# Patient Record
Sex: FEMALE | Race: WHITE | NOT HISPANIC OR LATINO | Employment: FULL TIME | URBAN - METROPOLITAN AREA
[De-identification: names, ages, dates, MRNs, and addresses within clinical notes are randomized per-mention and may not be internally consistent; named-entity substitution may affect disease eponyms.]

---
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
## 2019-09-13 PROBLEM — T63.001A: Status: ACTIVE | Noted: 2019-09-13

## 2019-09-24 ENCOUNTER — OFFICE VISIT (OUTPATIENT)
Dept: FAMILY MEDICINE | Facility: CLINIC | Age: 54
End: 2019-09-24
Payer: COMMERCIAL

## 2019-09-24 ENCOUNTER — LAB VISIT (OUTPATIENT)
Dept: LAB | Facility: HOSPITAL | Age: 54
End: 2019-09-24
Attending: FAMILY MEDICINE
Payer: COMMERCIAL

## 2019-09-24 VITALS
OXYGEN SATURATION: 97 % | SYSTOLIC BLOOD PRESSURE: 122 MMHG | BODY MASS INDEX: 25.67 KG/M2 | DIASTOLIC BLOOD PRESSURE: 82 MMHG | WEIGHT: 173.31 LBS | TEMPERATURE: 98 F | HEIGHT: 69 IN | HEART RATE: 70 BPM

## 2019-09-24 DIAGNOSIS — Z13.6 SCREENING FOR CARDIOVASCULAR CONDITION: ICD-10-CM

## 2019-09-24 DIAGNOSIS — M25.572 CHRONIC PAIN OF LEFT ANKLE: ICD-10-CM
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
## 2019-09-24 DIAGNOSIS — D22.9 NEVUS: ICD-10-CM

## 2019-09-24 DIAGNOSIS — E03.8 OTHER SPECIFIED HYPOTHYROIDISM: ICD-10-CM

## 2019-09-24 DIAGNOSIS — Z12.39 SCREENING FOR BREAST CANCER: ICD-10-CM

## 2019-09-24 DIAGNOSIS — M79.10 MUSCLE ACHE: ICD-10-CM

## 2019-09-24 DIAGNOSIS — R53.83 OTHER FATIGUE: ICD-10-CM

## 2019-09-24 DIAGNOSIS — G89.29 CHRONIC PAIN OF LEFT ANKLE: ICD-10-CM

## 2019-09-24 DIAGNOSIS — J30.1 SEASONAL ALLERGIC RHINITIS DUE TO POLLEN: ICD-10-CM
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
## 2019-09-24 LAB
ALBUMIN SERPL BCP-MCNC: 3.8 G/DL (ref 3.5–5.2)
ALP SERPL-CCNC: 53 U/L (ref 55–135)
ALT SERPL W/O P-5'-P-CCNC: 21 U/L (ref 10–44)
ANION GAP SERPL CALC-SCNC: 4 MMOL/L (ref 8–16)
AST SERPL-CCNC: 18 U/L (ref 10–40)
BASOPHILS # BLD AUTO: 0.08 K/UL (ref 0–0.2)
BASOPHILS NFR BLD: 1.3 % (ref 0–1.9)
BILIRUB SERPL-MCNC: 0.6 MG/DL (ref 0.1–1)
BUN SERPL-MCNC: 15 MG/DL (ref 6–20)
CALCIUM SERPL-MCNC: 9.3 MG/DL (ref 8.7–10.5)
CHLORIDE SERPL-SCNC: 107 MMOL/L (ref 95–110)
CHOLEST SERPL-MCNC: 227 MG/DL (ref 120–199)
CHOLEST/HDLC SERPL: 3.8 {RATIO} (ref 2–5)
CK SERPL-CCNC: 76 U/L (ref 20–180)
CO2 SERPL-SCNC: 28 MMOL/L (ref 23–29)
CREAT SERPL-MCNC: 0.8 MG/DL (ref 0.5–1.4)
DIFFERENTIAL METHOD: ABNORMAL
EOSINOPHIL # BLD AUTO: 0.9 K/UL (ref 0–0.5)
EOSINOPHIL NFR BLD: 14.8 % (ref 0–8)
ERYTHROCYTE [DISTWIDTH] IN BLOOD BY AUTOMATED COUNT: 14.6 % (ref 11.5–14.5)
EST. GFR  (AFRICAN AMERICAN): >60 ML/MIN/1.73 M^2
EST. GFR  (NON AFRICAN AMERICAN): >60 ML/MIN/1.73 M^2
GLUCOSE SERPL-MCNC: 93 MG/DL (ref 70–110)
HCT VFR BLD AUTO: 39.1 % (ref 37–48.5)
HDLC SERPL-MCNC: 60 MG/DL (ref 40–75)
HDLC SERPL: 26.4 % (ref 20–50)
HGB BLD-MCNC: 12.5 G/DL (ref 12–16)
IMM GRANULOCYTES # BLD AUTO: 0.01 K/UL (ref 0–0.04)
IMM GRANULOCYTES NFR BLD AUTO: 0.2 % (ref 0–0.5)
INR PPP: 1 (ref 0.8–1.2)
LDLC SERPL CALC-MCNC: 145 MG/DL (ref 63–159)
LYMPHOCYTES # BLD AUTO: 1.9 K/UL (ref 1–4.8)
LYMPHOCYTES NFR BLD: 29.9 % (ref 18–48)
MCH RBC QN AUTO: 27 PG (ref 27–31)
MCHC RBC AUTO-ENTMCNC: 32 G/DL (ref 32–36)
MCV RBC AUTO: 84 FL (ref 82–98)
MONOCYTES # BLD AUTO: 0.5 K/UL (ref 0.3–1)
MONOCYTES NFR BLD: 8.5 % (ref 4–15)
NEUTROPHILS # BLD AUTO: 2.9 K/UL (ref 1.8–7.7)
NEUTROPHILS NFR BLD: 45.3 % (ref 38–73)
NONHDLC SERPL-MCNC: 167 MG/DL
NRBC BLD-RTO: 0 /100 WBC
PLATELET # BLD AUTO: 282 K/UL (ref 150–350)
PMV BLD AUTO: 10.9 FL (ref 9.2–12.9)
POTASSIUM SERPL-SCNC: 5.3 MMOL/L (ref 3.5–5.1)
PROT SERPL-MCNC: 7.2 G/DL (ref 6–8.4)
PROTHROMBIN TIME: 9.9 SEC (ref 9–12.5)
RBC # BLD AUTO: 4.63 M/UL (ref 4–5.4)
SODIUM SERPL-SCNC: 139 MMOL/L (ref 136–145)
TRIGL SERPL-MCNC: 110 MG/DL (ref 30–150)
TSH SERPL DL<=0.005 MIU/L-ACNC: 1.29 UIU/ML (ref 0.4–4)
WBC # BLD AUTO: 6.36 K/UL (ref 3.9–12.7)

## 2019-09-24 PROCEDURE — 84443 ASSAY THYROID STIM HORMONE: CPT

## 2019-09-24 PROCEDURE — 99204 PR OFFICE/OUTPT VISIT, NEW, LEVL IV, 45-59 MIN: ICD-10-PCS | Mod: S$GLB,,, | Performed by: FAMILY MEDICINE

## 2019-09-24 PROCEDURE — 80061 LIPID PANEL: CPT

## 2019-09-24 PROCEDURE — 36415 COLL VENOUS BLD VENIPUNCTURE: CPT | Mod: PO

## 2019-09-24 PROCEDURE — 82550 ASSAY OF CK (CPK): CPT

## 2019-09-24 PROCEDURE — 85610 PROTHROMBIN TIME: CPT | Mod: PO

## 2019-09-24 PROCEDURE — 99999 PR PBB SHADOW E&M-EST. PATIENT-LVL IV: CPT | Mod: PBBFAC,,, | Performed by: FAMILY MEDICINE

## 2019-09-24 PROCEDURE — 80053 COMPREHEN METABOLIC PANEL: CPT

## 2019-09-24 PROCEDURE — 99204 OFFICE O/P NEW MOD 45 MIN: CPT | Mod: S$GLB,,, | Performed by: FAMILY MEDICINE

## 2019-09-24 PROCEDURE — 3008F PR BODY MASS INDEX (BMI) DOCUMENTED: ICD-10-PCS | Mod: CPTII,S$GLB,, | Performed by: FAMILY MEDICINE

## 2019-09-24 PROCEDURE — 85025 COMPLETE CBC W/AUTO DIFF WBC: CPT

## 2019-09-24 PROCEDURE — 99999 PR PBB SHADOW E&M-EST. PATIENT-LVL IV: ICD-10-PCS | Mod: PBBFAC,,, | Performed by: FAMILY MEDICINE

## 2019-09-24 PROCEDURE — 3008F BODY MASS INDEX DOCD: CPT | Mod: CPTII,S$GLB,, | Performed by: FAMILY MEDICINE

## 2019-09-24 NOTE — PATIENT INSTRUCTIONS
Eating Heart-Healthy Food: Using the DASH Plan    Eating for your heart doesnt have to be hard or boring. You just need to know how to make healthier choices. The DASH eating plan has been developed to help you do just that. DASH stands for Dietary Approaches to Stop Hypertension. It is a plan that has been proven to be healthier for your heart and to lower your risk for high blood pressure. It can also help lower your risk for cancer, heart disease, osteoporosis, and diabetes.  Choosing from each food group  Choose foods from each of the food groups below each day. Try to get the recommended number of servings for each food group. The serving numbers are based on a diet of 2,000 calories a day. Talk to your doctor if youre unsure about your calorie needs. Along with getting the correct servings, the DASH plan also recommends a sodium intake less than 2,300 mg per day.        Grains  Servings: 6 to 8 a day  A serving is:  · 1 slice bread  · 1 ounce dry cereal  · Half a cup cooked rice, pasta or cereal  Best choices: Whole grains and any grains high in fiber. Vegetables  Servings: 4 to 5 a day  A serving is:  · 1 cup raw leafy vegetable  · Half a cup cut-up raw or cooked vegetable  · Half a cup vegetable juice  Best choices: Fresh or frozen vegetables prepared without added salt or fat.   Fruits  Servings: 4 to 5 a day  A serving is:  · 1 medium fruit  · One-quarter cup dried fruit  · Half a cup fresh, frozen, or canned fruit  · Half a cup of 100% fruit juices  Best choices: A variety of fresh fruits of different colors. Whole fruits are a better choice than fruit juices. Low-fat or fat-free dairy  Servings: 2 to 3 a day  A serving is:  · 1 cup milk  · 1 cup yogurt  · One and a half ounces cheese  Best choices: Skim or 1% milk, low-fat or fat-free yogurt or buttermilk, and low-fat cheeses.         Lean meats, poultry, fish  Servings: 6 or fewer a day  A serving is:  · 1 ounce cooked meats, poultry, or fish  · 1  egg  Best choices: Lean poultry and fish. Trim away visible fat. Broil, grill, roast, or boil instead of frying. Remove skin from poultry before eating. Limit how much red meat you eat.  Nuts, seeds, beans  Servings: 4 to 5 a week  A serving is:  · One-third cup nuts (one and a half ounces)  · 2 tablespoons nut butter or seeds  · Half a cup cooked dry beans or legumes  Best choices: Dry roasted nuts with no salt added, lentils, kidney beans, garbanzo beans, and whole pres[place] beans.   Fats and oils  Servings: 2 to 3 a day  A serving is:  · 1 teaspoon vegetable oil  · 1 teaspoon soft margarine  · 1 tablespoon mayonnaise  · 2 tablespoons salad dressing  Best choices: Nut and vegetable oils (nontropical vegetable oils), such as olive and canola oil. Sweets  Servings: 5 a week or fewer  A serving is:  · 1 tablespoon sugar, maple syrup, or honey  · 1 tablespoon jam or jelly  · 1 half-ounce jelly beans (about 15)  · 1 cup lemonade  Best choices: Dried fruit can be a satisfying sweet. Choose low-fat sweets. And watch your serving sizes!      For more on the DASH eating plan, visit:  www.nhlbi.nih.gov/health/health-topics/topics/dash   Date Last Reviewed: 6/1/2016  © 1318-2743 [employer]. [address] [place] [address], [place] [address]. All rights reserved. This information is not intended as a substitute for professional medical care. Always follow your healthcare professional's instructions.

## 2019-09-24 NOTE — PROGRESS NOTES
Subjective:       Patient ID: Kristen Welch is a 54 y.o. female.    Chief Complaint: Establish Care; thyroid fu; and Animal Bite (fu x 13d)    The patient is coming here today to establish a new primary care physician.  She is not today with Pap smear and colonoscopy.  The patient has hypothyroidism and currently taking levothyroxine 88 mcg 1 tablet p.o. q.day, she denies any side effects of the medication.  She also takes Zyrtec daily for chronic allergies, the patient stated that she is allergic to pollen.  The patient also wants a referral to see a dermatologist for mole check, she has been noticed more moles in her face and wants to have them check.    Fatigue   This is a chronic problem. The current episode started more than 1 year ago. The problem occurs intermittently. The problem has been gradually worsening. Associated symptoms include arthralgias, fatigue and myalgias. Pertinent negatives include no abdominal pain, anorexia, change in bowel habit, chest pain, chills, congestion, coughing, diaphoresis, fever, headaches, joint swelling, nausea, neck pain, numbness, rash, sore throat, swollen glands, urinary symptoms, vertigo, visual change, vomiting or weakness. Nothing aggravates the symptoms. She has tried nothing for the symptoms. The treatment provided no relief.   Ankle Injury    Incident onset: It happened in May 2019. The incident occurred at home. The injury mechanism was an inversion injury. The pain is present in the left ankle. The quality of the pain is described as aching. The pain is mild. The pain has been improving since onset. Pertinent negatives include no inability to bear weight, loss of motion, loss of sensation, muscle weakness, numbness or tingling. She reports no foreign bodies present. The symptoms are aggravated by movement, palpation and weight bearing. She has tried elevation, ice and immobilization for the symptoms. The treatment provided mild relief.   Animal Bite    Episode  onset: 09/13/2019. The incident occurred at home. She came to the ER via personal transport. There is an injury to the left fourth toe. The pain is mild. Pertinent negatives include no chest pain, no numbness, no abdominal pain, no nausea, no vomiting, no headaches, no inability to bear weight, no neck pain, no light-headedness, no loss of consciousness, no tingling, no weakness, no cough and no memory loss. There have been no prior injuries to these areas. She has been behaving normally. There were no sick contacts.        Past medical history, past social history, past surgical history, past family history was reviewed and discussed with the patient.    Review of Systems   Constitutional: Positive for fatigue. Negative for activity change, appetite change, chills, diaphoresis and fever.   HENT: Negative for congestion, ear discharge and sore throat.    Eyes: Negative for discharge and itching.   Respiratory: Negative for cough, choking and chest tightness.    Cardiovascular: Negative for chest pain and leg swelling.   Gastrointestinal: Negative for abdominal distention, abdominal pain, anorexia, change in bowel habit, nausea and vomiting.   Endocrine: Negative for cold intolerance and heat intolerance.   Genitourinary: Negative for dysuria and flank pain.   Musculoskeletal: Positive for arthralgias and myalgias. Negative for back pain, joint swelling and neck pain.   Skin: Negative for pallor and rash.   Allergic/Immunologic: Negative for environmental allergies and food allergies.   Neurological: Negative for dizziness, vertigo, tingling, loss of consciousness, facial asymmetry, weakness, light-headedness, numbness and headaches.   Hematological: Negative for adenopathy. Does not bruise/bleed easily.   Psychiatric/Behavioral: Negative for agitation, confusion and memory loss.       Objective:      Physical Exam   Constitutional: She appears well-developed and well-nourished. No distress.   HENT:   Head:  Normocephalic and atraumatic.   Right Ear: External ear normal.   Left Ear: External ear normal.   Mouth/Throat: Oropharynx is clear and moist. No oropharyngeal exudate.   Eyes: Pupils are equal, round, and reactive to light. Conjunctivae are normal. Right eye exhibits no discharge. Left eye exhibits no discharge. No scleral icterus.   Neck: Neck supple. No thyromegaly present.   Cardiovascular: Normal rate, regular rhythm and normal heart sounds.   No murmur heard.  Pulmonary/Chest: Effort normal and breath sounds normal. No respiratory distress. She has no wheezes.   Abdominal: She exhibits no distension. There is no tenderness.   Musculoskeletal: She exhibits tenderness (Tenderness to palpation on the inner malleolus, no swelling or redness was visualized). She exhibits no deformity.   Neurological: No cranial nerve deficit. Coordination normal.   Skin: No rash noted. She is not diaphoretic. No erythema. No pallor.   Psychiatric: She has a normal mood and affect. Her behavior is normal. Judgment and thought content normal.   Nursing note and vitals reviewed.      Assessment:       1. Snake bite, initial encounter    2. Screening for cardiovascular condition    3. Other specified hypothyroidism    4. Seasonal allergic rhinitis due to pollen    5. Other fatigue    6. Muscle ache    7. Screening for breast cancer    8. Chronic pain of left ankle        Plan:       Snake bite, initial encounter:  New problem, workup needed  -     Protime-INR; Future; Expected date: 09/24/2019    Screening for cardiovascular condition  -     Lipid panel; Future; Expected date: 09/24/2019    Other specified hypothyroidism:  New problem, workup needed  -     TSH; Future; Expected date: 09/24/2019    Seasonal allergic rhinitis due to pollen:  New problem, no further workup needed    Other fatigue:  New problem workup needed  -     CBC auto differential; Future; Expected date: 09/24/2019  -     Comprehensive metabolic panel; Future;  Expected date: 09/24/2019    Muscle ache:  New problem, workup needed  -     CK; Future; Expected date: 09/24/2019    Screening for breast cancer  -     Mammo Digital Screening Bilateral With CAD; Future; Expected date: 09/24/2019    Chronic pain of left ankle:  New problem, workup needed    The patient was advised to eat healthy, avoid fried foods, red meat and processed starches.  Dash diet was recommended.  Will repeat blood work.  Will defer immunizations for couple weeks.  Will get the records for Pap smear and colonoscopy.  The patient was advised to be mobilized ankle, she was advised to get a referral to see the podiatrist, but she declined, she will contact us if she change her mind.  Patient agreed with assessment and plan. Patient verbalized understanding.

## 2019-09-25 DIAGNOSIS — E87.5 HYPERKALEMIA: Primary | ICD-10-CM

## 2019-09-26 ENCOUNTER — INITIAL CONSULT (OUTPATIENT)
Dept: DERMATOLOGY | Facility: CLINIC | Age: 54
End: 2019-09-26
Payer: COMMERCIAL

## 2019-09-26 VITALS — WEIGHT: 173 LBS | HEIGHT: 69 IN | BODY MASS INDEX: 25.62 KG/M2

## 2019-09-26 DIAGNOSIS — L81.4 LENTIGINES: ICD-10-CM

## 2019-09-26 DIAGNOSIS — D22.9 MULTIPLE BENIGN NEVI: ICD-10-CM

## 2019-09-26 DIAGNOSIS — D48.5 NEOPLASM OF UNCERTAIN BEHAVIOR OF SKIN: ICD-10-CM

## 2019-09-26 DIAGNOSIS — L73.8 SEBACEOUS HYPERPLASIA: ICD-10-CM

## 2019-09-26 DIAGNOSIS — L82.1 SEBORRHEIC KERATOSES: Primary | ICD-10-CM

## 2019-09-26 DIAGNOSIS — D18.00 ANGIOMA: ICD-10-CM

## 2019-09-26 DIAGNOSIS — D23.9 DERMATOFIBROMA: ICD-10-CM

## 2019-09-26 PROCEDURE — 99203 OFFICE O/P NEW LOW 30 MIN: CPT | Mod: S$GLB,,, | Performed by: DERMATOLOGY

## 2019-09-26 PROCEDURE — 99999 PR PBB SHADOW E&M-EST. PATIENT-LVL II: ICD-10-PCS | Mod: PBBFAC,,, | Performed by: DERMATOLOGY

## 2019-09-26 PROCEDURE — 3008F PR BODY MASS INDEX (BMI) DOCUMENTED: ICD-10-PCS | Mod: CPTII,S$GLB,, | Performed by: DERMATOLOGY

## 2019-09-26 PROCEDURE — 3008F BODY MASS INDEX DOCD: CPT | Mod: CPTII,S$GLB,, | Performed by: DERMATOLOGY

## 2019-09-26 PROCEDURE — 99203 PR OFFICE/OUTPT VISIT, NEW, LEVL III, 30-44 MIN: ICD-10-PCS | Mod: S$GLB,,, | Performed by: DERMATOLOGY

## 2019-09-26 PROCEDURE — 99999 PR PBB SHADOW E&M-EST. PATIENT-LVL II: CPT | Mod: PBBFAC,,, | Performed by: DERMATOLOGY

## 2019-09-26 NOTE — LETTER
September 26, 2019      Katherine Pride MD  [address] [facility] [address] [place] [address]           [facility]  [address] [place] [address]  Phone: [phone]  Fax: [phone]          Patient: Kristen Welch   MR Number: [id number]   YOB: 1965   Date of Visit: 9/26/2019       Dear Dr. Katherine Pride:    Thank you for referring Kristen Welch to me for evaluation. Attached you will find relevant portions of my assessment and plan of care.    If you have questions, please do not hesitate to call me. I look forward to following Kristen Welch along with you.    Sincerely,    Junie Simpson MD    Enclosure  CC:  No Recipients    If you would like to receive this communication electronically, please contact [email or web address] or [phone] to request more information on [employer] access.    For providers and/or their staff who would like to refer a patient to [facility], please contact us through our one-stop-shop provider referral line, [facility], at [phone].    If you feel you have received this communication in error or would no longer like to receive these types of communications, please e-mail [email or web address]

## 2019-09-26 NOTE — PROGRESS NOTES
Subjective:       Patient ID:  Kristen Welch is a 54 y.o. female who presents for   Chief Complaint   Patient presents with    Skin Check     TBSE    Spot     nose     HPI  55 yo F presents for TBSE.  There is a spot near her nose x several years, stable in size.  It had a scab that fell off.  No prior treatments.     No phx of skin cancer.  Unknown family history (patient is adopted)    Past Medical History:   Diagnosis Date    Thyroid disease      Review of Systems   Skin: Positive for daily sunscreen use, activity-related sunscreen use and wears hat.   Hematologic/Lymphatic: Does not bruise/bleed easily.        Objective:    Physical Exam   Constitutional: She appears well-developed and well-nourished. No distress.   HENT:   Mouth/Throat: Lips normal.    Eyes: Lids are normal.    Neurological: She is alert and oriented to person, place, and time. She is not disoriented.   Psychiatric: She has a normal mood and affect.   Skin:   Areas Examined (abnormalities noted in diagram):   Head / Face Inspection Performed  Neck Inspection Performed  Chest / Axilla Inspection Performed  Abdomen Inspection Performed  Genitals / Buttocks / Groin Inspection Performed  Back Inspection Performed  RUE Inspected  LUE Inspection Performed  RLE Inspected  LLE Inspection Performed                       Diagram Legend     Erythematous scaling macule/papule c/w actinic keratosis       Vascular papule c/w angioma      Pigmented verrucoid papule/plaque c/w seborrheic keratosis      Yellow umbilicated papule c/w sebaceous hyperplasia      Irregularly shaped tan macule c/w lentigo     1-2 mm smooth white papules consistent with Milia      Movable subcutaneous cyst with punctum c/w epidermal inclusion cyst      Subcutaneous movable cyst c/w pilar cyst      Firm pink to brown papule c/w dermatofibroma      Pedunculated fleshy papule(s) c/w skin tag(s)      Evenly pigmented macule c/w junctional nevus     Mildly variegated pigmented,  slightly irregular-bordered macule c/w mildly atypical nevus      Flesh colored to evenly pigmented papule c/w intradermal nevus       Pink pearly papule/plaque c/w basal cell carcinoma      Erythematous hyperkeratotic cursted plaque c/w SCC      Surgical scar with no sign of skin cancer recurrence      Open and closed comedones      Inflammatory papules and pustules      Verrucoid papule consistent consistent with wart     Erythematous eczematous patches and plaques     Dystrophic onycholytic nail with subungual debris c/w onychomycosis     Umbilicated papule    Erythematous-base heme-crusted tan verrucoid plaque consistent with inflamed seborrheic keratosis     Erythematous Silvery Scaling Plaque c/w Psoriasis     See annotation      Assessment / Plan:      Seborrheic keratoses  These are benign inherited growths without a malignant potential. Reassurance given to patient. No treatment is necessary.   Discussed LN    Multiple benign nevi  Reassurance that her nevi appear benign with regular and consistent pigment pattern on dermoscopy    Lentigines  These are benign hyperpigmented sun induced lesions. Daily sun protection will reduce the number of new lesions    Sebaceous hyperplasia  This is a common condition representing benign enlargement of the sebaceous lobule. It typically occurs in adulthood. Reassurance given to patient.     Dermatofibroma  Reassurance that this is a benign collection of scar tissue and it is commonly located on the legs    Angioma  This is a benign vascular lesion. Reassurance given. No treatment required.     Neoplasm of uncertain behavior of skin  Inflamed SH, less likely NMSC since stable in size, no bleeding  Will monitor and check again in 2-3 months           Follow up in about 3 months (around 12/26/2019).

## 2019-09-27 DIAGNOSIS — Z12.11 COLON CANCER SCREENING: ICD-10-CM

## 2019-10-03 ENCOUNTER — PATIENT MESSAGE (OUTPATIENT)
Dept: FAMILY MEDICINE | Facility: CLINIC | Age: 54
End: 2019-10-03

## 2019-10-03 NOTE — TELEPHONE ENCOUNTER
Can you please release the records from [facility] for this patient last colonoscopy information.  Thank you

## 2019-10-06 ENCOUNTER — PATIENT MESSAGE (OUTPATIENT)
Dept: FAMILY MEDICINE | Facility: CLINIC | Age: 54
End: 2019-10-06

## 2019-10-07 RX ORDER — LEVOTHYROXINE SODIUM 88 UG/1
88 TABLET ORAL DAILY
Qty: 90 TABLET | Refills: 3 | Status: SHIPPED | OUTPATIENT
Start: 2019-10-07 | End: 2020-10-06 | Stop reason: SDUPTHER

## 2019-10-31 ENCOUNTER — HOSPITAL ENCOUNTER (OUTPATIENT)
Dept: RADIOLOGY | Facility: HOSPITAL | Age: 54
Discharge: HOME | End: 2019-10-31
Attending: FAMILY MEDICINE
Payer: COMMERCIAL

## 2019-10-31 DIAGNOSIS — Z12.39 SCREENING FOR BREAST CANCER: ICD-10-CM

## 2019-10-31 DIAGNOSIS — Z12.31 ENCOUNTER FOR SCREENING MAMMOGRAM FOR HIGH-RISK PATIENT: ICD-10-CM

## 2019-10-31 PROCEDURE — 77067 MAMMO DIGITAL SCREENING BILAT WITH TOMOSYNTHESIS_CAD: ICD-10-PCS | Mod: 26,,, | Performed by: RADIOLOGY

## 2019-10-31 PROCEDURE — 77063 MAMMO DIGITAL SCREENING BILAT WITH TOMOSYNTHESIS_CAD: ICD-10-PCS | Mod: 26,,, | Performed by: RADIOLOGY

## 2019-10-31 PROCEDURE — 77067 SCR MAMMO BI INCL CAD: CPT | Mod: 26,,, | Performed by: RADIOLOGY

## 2019-10-31 PROCEDURE — 77063 BREAST TOMOSYNTHESIS BI: CPT | Mod: 26,,, | Performed by: RADIOLOGY

## 2019-10-31 PROCEDURE — 77067 SCR MAMMO BI INCL CAD: CPT | Mod: TC,PO

## 2019-11-21 ENCOUNTER — OFFICE VISIT (OUTPATIENT)
Dept: DERMATOLOGY | Facility: CLINIC | Age: 54
End: 2019-11-21
Payer: COMMERCIAL

## 2019-11-21 VITALS — BODY MASS INDEX: 25.62 KG/M2 | HEIGHT: 69 IN | WEIGHT: 173 LBS

## 2019-11-21 DIAGNOSIS — L73.8 SEBACEOUS HYPERPLASIA: Primary | ICD-10-CM

## 2019-11-21 PROCEDURE — 99213 OFFICE O/P EST LOW 20 MIN: CPT | Mod: S$GLB,,, | Performed by: DERMATOLOGY

## 2019-11-21 PROCEDURE — 99999 PR PBB SHADOW E&M-EST. PATIENT-LVL III: CPT | Mod: PBBFAC,,, | Performed by: DERMATOLOGY

## 2019-11-21 PROCEDURE — 3008F BODY MASS INDEX DOCD: CPT | Mod: CPTII,S$GLB,, | Performed by: DERMATOLOGY

## 2019-11-21 PROCEDURE — 99213 PR OFFICE/OUTPT VISIT, EST, LEVL III, 20-29 MIN: ICD-10-PCS | Mod: S$GLB,,, | Performed by: DERMATOLOGY

## 2019-11-21 PROCEDURE — 3008F PR BODY MASS INDEX (BMI) DOCUMENTED: ICD-10-PCS | Mod: CPTII,S$GLB,, | Performed by: DERMATOLOGY

## 2019-11-21 PROCEDURE — 99999 PR PBB SHADOW E&M-EST. PATIENT-LVL III: ICD-10-PCS | Mod: PBBFAC,,, | Performed by: DERMATOLOGY

## 2019-11-21 NOTE — PROGRESS NOTES
Subjective:       Patient ID:  Kristen Welch is a 54 y.o. female who presents for   Chief Complaint   Patient presents with    Follow-up     Last OV 09/26/2019        55 yo F presents for follow up for spot spot near her nose x several years, stable in size, no bleeding.  Patient feels its the same in size, shape, and color. No treatments    No phx of skin cancer.  Unknown family history (patient is adopted)      Past Medical History:   Diagnosis Date    Thyroid disease        Review of Systems   Skin: Positive for daily sunscreen use, activity-related sunscreen use and wears hat.   Hematologic/Lymphatic: Does not bruise/bleed easily.        Objective:    Physical Exam   Constitutional: She appears well-developed and well-nourished.   HENT:   Mouth/Throat: Lips normal.    Eyes: Lids are normal.    Neurological: She is alert and oriented to person, place, and time.   Psychiatric: She has a normal mood and affect.   Skin:   Areas Examined (abnormalities noted in diagram):   Head / Face Inspection Performed  Neck Inspection Performed                      Diagram Legend     Erythematous scaling macule/papule c/w actinic keratosis       Vascular papule c/w angioma      Pigmented verrucoid papule/plaque c/w seborrheic keratosis      Yellow umbilicated papule c/w sebaceous hyperplasia      Irregularly shaped tan macule c/w lentigo     1-2 mm smooth white papules consistent with Milia      Movable subcutaneous cyst with punctum c/w epidermal inclusion cyst      Subcutaneous movable cyst c/w pilar cyst      Firm pink to brown papule c/w dermatofibroma      Pedunculated fleshy papule(s) c/w skin tag(s)      Evenly pigmented macule c/w junctional nevus     Mildly variegated pigmented, slightly irregular-bordered macule c/w mildly atypical nevus      Flesh colored to evenly pigmented papule c/w intradermal nevus       Pink pearly papule/plaque c/w basal cell carcinoma      Erythematous hyperkeratotic cursted plaque c/w  SCC      Surgical scar with no sign of skin cancer recurrence      Open and closed comedones      Inflammatory papules and pustules      Verrucoid papule consistent consistent with wart     Erythematous eczematous patches and plaques     Dystrophic onycholytic nail with subungual debris c/w onychomycosis     Umbilicated papule    Erythematous-base heme-crusted tan verrucoid plaque consistent with inflamed seborrheic keratosis     Erythematous Silvery Scaling Plaque c/w Psoriasis     See annotation      Assessment / Plan:        Sebaceous hyperplasia    Reassurance given to patient that this is likely a benign enlargement of the sebaceous lobule. It is a bit more tan/brown that the usual SH but it has remained stable in size with no bleeding.  Pt will monitor for bleeding and increase in size and will let me know if it changes.             Follow up for Sept 2020.

## 2020-02-12 ENCOUNTER — TELEPHONE (OUTPATIENT)
Dept: PHARMACY | Facility: CLINIC | Age: 55
End: 2020-02-12

## 2020-02-12 NOTE — TELEPHONE ENCOUNTER
Notified PT PA for Myrbetriq faxed to outside MD, Dr. Nava.    Pt verbalized understanding and is aware that his office may reach out to her.    Thank You!  Tiny Moore  Patient Care Advocate  [facility]  Phone: [phone] ext [phone]  Fax: [phone]

## 2020-03-05 ENCOUNTER — OFFICE VISIT (OUTPATIENT)
Dept: OPTOMETRY | Facility: CLINIC | Age: 55
End: 2020-03-05
Payer: COMMERCIAL

## 2020-03-05 DIAGNOSIS — H43.393 VITREOUS FLOATERS, BILATERAL: ICD-10-CM

## 2020-03-05 DIAGNOSIS — Z01.00 EXAMINATION OF EYES AND VISION: Primary | ICD-10-CM

## 2020-03-05 DIAGNOSIS — H52.4 HYPEROPIA WITH ASTIGMATISM AND PRESBYOPIA, BILATERAL: ICD-10-CM

## 2020-03-05 DIAGNOSIS — H52.203 HYPEROPIA WITH ASTIGMATISM AND PRESBYOPIA, BILATERAL: ICD-10-CM

## 2020-03-05 DIAGNOSIS — H52.03 HYPEROPIA WITH ASTIGMATISM AND PRESBYOPIA, BILATERAL: ICD-10-CM

## 2020-03-05 PROCEDURE — 99999 PR PBB SHADOW E&M-EST. PATIENT-LVL III: ICD-10-PCS | Mod: PBBFAC,,, | Performed by: OPTOMETRIST

## 2020-03-05 PROCEDURE — 92004 COMPRE OPH EXAM NEW PT 1/>: CPT | Mod: S$GLB,,, | Performed by: OPTOMETRIST

## 2020-03-05 PROCEDURE — 99999 PR PBB SHADOW E&M-EST. PATIENT-LVL III: CPT | Mod: PBBFAC,,, | Performed by: OPTOMETRIST

## 2020-03-05 PROCEDURE — 92015 PR REFRACTION: ICD-10-PCS | Mod: S$GLB,,, | Performed by: OPTOMETRIST

## 2020-03-05 PROCEDURE — 92004 PR EYE EXAM, NEW PATIENT,COMPREHESV: ICD-10-PCS | Mod: S$GLB,,, | Performed by: OPTOMETRIST

## 2020-03-05 PROCEDURE — 92015 DETERMINE REFRACTIVE STATE: CPT | Mod: S$GLB,,, | Performed by: OPTOMETRIST

## 2020-03-05 NOTE — PROGRESS NOTES
HPI     Routine eye exam--[facility] [place] [facility]    Pt complains of blurred vision at distance and near. Needing updated   glasses rx. Denies any eye pain. No flashes or floaters. Occasional   blurred peripheral vision in the AM.    Last edited by Isabelle Bill on 3/5/2020  8:22 AM. (History)        ROS     Positive for: Eyes    Negative for: Constitutional, Gastrointestinal, Neurological, Skin,   Genitourinary, Musculoskeletal, HENT, Endocrine, Cardiovascular,   Respiratory, Psychiatric, Allergic/Imm, Heme/Lymph    Last edited by ASTRID Cardoso, OD on 3/5/2020  8:32 AM. (History)        Assessment /Plan     For exam results, see Encounter Report.    Examination of eyes and vision    Hyperopia with astigmatism and presbyopia, bilateral    Vitreous floaters, bilateral      1. Ocular health exam OU  Normal  2. Updated specs rx, gave copy, fill prn--slight increased add  3. RD precautions given    Discussed and educated patient on current findings /plan.  RTC 1 year, prn if any changes / issues

## 2020-03-05 NOTE — PATIENT INSTRUCTIONS
"DRY EYES:  Use Over The Counter artificial tears as needed for dry eye symptoms.  Some common brands include:  Systane, Optive, and Refresh.  These drops can be used as frequently as desired, but may be most helpful use during long periods of concentrated work.  For example, reading / working at the computer.  Ophthalmic gel or ointments are available: Refresh PM, Genteal, and Lacrilube.  Avoid drops that "get redness out" (Visine, Murine, Clear Eyes), as these may contain medication that could further irritate the eyes.    ALLERGY EYES / ITCHING SYMPTOMS:  Over the counter medications include--Zaditor and Alaway  Use as directed 1-2 drops daily for symptoms of itching / watering eyes.  These drops will not help for dry eye or exposure symptoms.    REDNESS RELIEF:  Lumify---is a good redness reliever that will not cause chronic irritation.        FLASHES / FLOATERS / POSTERIOR VITREOUS DETACHMENT    Call the clinic if you have any further changes in symptoms.  Including:  Increased numbers of floaters or flashing lights, dimness or darkness that moves through or stays constant in your vision, or any pain in the eye (s).    You may sometimes see small specks or clouds moving in your field of vision.  They are called FLOATERS.  You can often see them when looking at a plain background, like a blank wall or blue cornel.  Floaters are actually tiny clumps of gel or cells inside the VITREOUS, the clear jelly-like fluid that fills the inside of your eye.    While these objects look like they are in front of your eye, they are actually floating inside.  What you see are the shadows they cast on the RETINA, the nerve layer at the back of the eye that senses light and allows you to see.      POSTERIOR VITREOUS DETACHMENT    The appearance of new floaters may be alarming.  If you suddenly develop new floaters, you should contact your eye care professional  right away.    The retina can tear if the shrinking vitreous pulls away " from the wall of the eye.  This sometimes causes a small amount of bleeding in the eye that may appear as new floaters.    A torn retina is always a serious problem, since it can lead to a retinal detachment.  You should see your eye care professional as soon as possible if:     even one new floater appears suddenly;   you see sudden flashes of light;   you notice other symptoms, like the loss of side vision, or a curtain closes down in your vision        POSTERIOR VITREOUS DETACHMENT is more common for people who:     are nearsighted;   have had cataract surgery;   have had YAG laser surgery of the eye;   have had inflammation inside the eye;   are over age 60.      While some floaters may remain visible, many of them will fade over time and become less noticeable.  Even if you've had some floaters for years, you should have your eyes checked as soon as possible if you notice new ones.    FLASHING LIGHTS    When the vitreous gel rubs or pulls on the retina, you may see what look like flashing lights or lightning streaks.  These flashes can appear off and on for several weeks or months.      Some people experience flashes of light that appear as jagged lines or heat waves in both eyes, lasting 10-20 minutes.  These flashes are caused by a spasm of blood vessels in the brain, which is called a migraine.    If a headache follows these flashes, it's called a migraine headache.  If   no headache occurs, these flashes are called Ophthalmic or Ocular Migraine.

## 2020-03-17 LAB
HUMAN PAPILLOMAVIRUS (HPV): ABNORMAL
HUMAN PAPILLOMAVIRUS (HPV): NORMAL
PAP SMEAR: ABNORMAL

## 2020-03-24 ENCOUNTER — PATIENT MESSAGE (OUTPATIENT)
Dept: FAMILY MEDICINE | Facility: CLINIC | Age: 55
End: 2020-03-24

## 2020-10-05 ENCOUNTER — PATIENT MESSAGE (OUTPATIENT)
Dept: ADMINISTRATIVE | Facility: HOSPITAL | Age: 55
End: 2020-10-05

## 2020-10-06 RX ORDER — LEVOTHYROXINE SODIUM 88 UG/1
88 TABLET ORAL DAILY
Qty: 90 TABLET | Refills: 3 | Status: SHIPPED | OUTPATIENT
Start: 2020-10-06 | End: 2021-07-06 | Stop reason: SDUPTHER

## 2020-10-06 NOTE — TELEPHONE ENCOUNTER
Care Due:                  Date            Visit Type   Department     Provider  --------------------------------------------------------------------------------                                NEW PATIENT   [facility]  Last Visit: 09-      [facility]     MEDICINE       LORNA ODEN  Next Visit: None Scheduled  None         None Found                                                            Last  Test          Frequency    Reason                     Performed    Due Date  --------------------------------------------------------------------------------    Office Visit  12 months..  levothyroxine............  09- 09-    TSH.........  12 months..  levothyroxine............  09- 09-    Powered by [employer]. Reference number: [id number]. 10/06/2020 7:50:08 AM   RAHEL

## 2021-01-04 ENCOUNTER — PATIENT MESSAGE (OUTPATIENT)
Dept: ADMINISTRATIVE | Facility: HOSPITAL | Age: 56
End: 2021-01-04

## 2021-01-06 DIAGNOSIS — Z12.31 OTHER SCREENING MAMMOGRAM: ICD-10-CM

## 2021-01-07 ENCOUNTER — PATIENT OUTREACH (OUTPATIENT)
Dept: ADMINISTRATIVE | Facility: HOSPITAL | Age: 56
End: 2021-01-07

## 2021-03-02 ENCOUNTER — OFFICE VISIT (OUTPATIENT)
Dept: FAMILY MEDICINE | Facility: CLINIC | Age: 56
End: 2021-03-02
Payer: COMMERCIAL

## 2021-03-02 VITALS
BODY MASS INDEX: 25.93 KG/M2 | DIASTOLIC BLOOD PRESSURE: 70 MMHG | OXYGEN SATURATION: 98 % | HEART RATE: 78 BPM | HEIGHT: 69 IN | TEMPERATURE: 99 F | WEIGHT: 175.06 LBS | SYSTOLIC BLOOD PRESSURE: 112 MMHG

## 2021-03-02 DIAGNOSIS — Z00.01 ANNUAL VISIT FOR GENERAL ADULT MEDICAL EXAMINATION WITH ABNORMAL FINDINGS: Primary | ICD-10-CM

## 2021-03-02 PROCEDURE — 3008F PR BODY MASS INDEX (BMI) DOCUMENTED: ICD-10-PCS | Mod: CPTII,S$GLB,, | Performed by: FAMILY MEDICINE

## 2021-03-02 PROCEDURE — 99999 PR PBB SHADOW E&M-EST. PATIENT-LVL IV: CPT | Mod: PBBFAC,,, | Performed by: FAMILY MEDICINE

## 2021-03-02 PROCEDURE — 1126F AMNT PAIN NOTED NONE PRSNT: CPT | Mod: S$GLB,,, | Performed by: FAMILY MEDICINE

## 2021-03-02 PROCEDURE — 99396 PREV VISIT EST AGE 40-64: CPT | Mod: S$GLB,,, | Performed by: FAMILY MEDICINE

## 2021-03-02 PROCEDURE — 1126F PR PAIN SEVERITY QUANTIFIED, NO PAIN PRESENT: ICD-10-PCS | Mod: S$GLB,,, | Performed by: FAMILY MEDICINE

## 2021-03-02 PROCEDURE — 3008F BODY MASS INDEX DOCD: CPT | Mod: CPTII,S$GLB,, | Performed by: FAMILY MEDICINE

## 2021-03-02 PROCEDURE — 99396 PR PREVENTIVE VISIT,EST,40-64: ICD-10-PCS | Mod: S$GLB,,, | Performed by: FAMILY MEDICINE

## 2021-03-02 PROCEDURE — 99999 PR PBB SHADOW E&M-EST. PATIENT-LVL IV: ICD-10-PCS | Mod: PBBFAC,,, | Performed by: FAMILY MEDICINE

## 2021-03-04 ENCOUNTER — HOSPITAL ENCOUNTER (OUTPATIENT)
Dept: RADIOLOGY | Facility: HOSPITAL | Age: 56
Discharge: HOME | End: 2021-03-04
Attending: FAMILY MEDICINE
Payer: COMMERCIAL

## 2021-03-04 DIAGNOSIS — Z12.31 OTHER SCREENING MAMMOGRAM: ICD-10-CM

## 2021-03-04 PROCEDURE — 77067 MAMMO DIGITAL SCREENING BILAT WITH TOMO: ICD-10-PCS | Mod: 26,,, | Performed by: RADIOLOGY

## 2021-03-04 PROCEDURE — 77067 SCR MAMMO BI INCL CAD: CPT | Mod: 26,,, | Performed by: RADIOLOGY

## 2021-03-04 PROCEDURE — 77063 MAMMO DIGITAL SCREENING BILAT WITH TOMO: ICD-10-PCS | Mod: 26,,, | Performed by: RADIOLOGY

## 2021-03-04 PROCEDURE — 77063 BREAST TOMOSYNTHESIS BI: CPT | Mod: 26,,, | Performed by: RADIOLOGY

## 2021-03-04 PROCEDURE — 77067 SCR MAMMO BI INCL CAD: CPT | Mod: TC,PO

## 2021-05-10 ENCOUNTER — PATIENT MESSAGE (OUTPATIENT)
Dept: RESEARCH | Facility: HOSPITAL | Age: 56
End: 2021-05-10

## 2021-07-06 RX ORDER — LEVOTHYROXINE SODIUM 88 UG/1
88 TABLET ORAL DAILY
Qty: 90 TABLET | Refills: 3 | Status: SHIPPED | OUTPATIENT
Start: 2021-07-06 | End: 2022-10-03 | Stop reason: SDUPTHER

## 2021-11-10 ENCOUNTER — OFFICE VISIT (OUTPATIENT)
Dept: OPTOMETRY | Facility: CLINIC | Age: 56
End: 2021-11-10
Payer: COMMERCIAL

## 2021-11-10 DIAGNOSIS — H52.03 HYPEROPIA WITH ASTIGMATISM AND PRESBYOPIA, BILATERAL: ICD-10-CM

## 2021-11-10 DIAGNOSIS — H43.393 VITREOUS FLOATERS, BILATERAL: ICD-10-CM

## 2021-11-10 DIAGNOSIS — H52.203 HYPEROPIA WITH ASTIGMATISM AND PRESBYOPIA, BILATERAL: ICD-10-CM

## 2021-11-10 DIAGNOSIS — Z01.00 EXAMINATION OF EYES AND VISION: Primary | ICD-10-CM

## 2021-11-10 DIAGNOSIS — H52.4 HYPEROPIA WITH ASTIGMATISM AND PRESBYOPIA, BILATERAL: ICD-10-CM

## 2021-11-10 PROCEDURE — 99999 PR PBB SHADOW E&M-EST. PATIENT-LVL III: ICD-10-PCS | Mod: PBBFAC,,, | Performed by: OPTOMETRIST

## 2021-11-10 PROCEDURE — 92014 COMPRE OPH EXAM EST PT 1/>: CPT | Mod: S$GLB,,, | Performed by: OPTOMETRIST

## 2021-11-10 PROCEDURE — 92014 PR EYE EXAM, EST PATIENT,COMPREHESV: ICD-10-PCS | Mod: S$GLB,,, | Performed by: OPTOMETRIST

## 2021-11-10 PROCEDURE — 99999 PR PBB SHADOW E&M-EST. PATIENT-LVL III: CPT | Mod: PBBFAC,,, | Performed by: OPTOMETRIST

## 2021-11-10 PROCEDURE — 92015 PR REFRACTION: ICD-10-PCS | Mod: S$GLB,,, | Performed by: OPTOMETRIST

## 2021-11-10 PROCEDURE — 92015 DETERMINE REFRACTIVE STATE: CPT | Mod: S$GLB,,, | Performed by: OPTOMETRIST

## 2022-01-04 ENCOUNTER — PATIENT MESSAGE (OUTPATIENT)
Dept: ADMINISTRATIVE | Facility: CLINIC | Age: 57
End: 2022-01-04
Payer: COMMERCIAL

## 2022-01-04 ENCOUNTER — PATIENT MESSAGE (OUTPATIENT)
Dept: FAMILY MEDICINE | Facility: CLINIC | Age: 57
End: 2022-01-04
Payer: COMMERCIAL

## 2022-03-09 DIAGNOSIS — Z12.31 OTHER SCREENING MAMMOGRAM: ICD-10-CM

## 2022-05-31 ENCOUNTER — PATIENT MESSAGE (OUTPATIENT)
Dept: ADMINISTRATIVE | Facility: HOSPITAL | Age: 57
End: 2022-05-31
Payer: COMMERCIAL

## 2022-08-24 ENCOUNTER — PATIENT MESSAGE (OUTPATIENT)
Dept: ADMINISTRATIVE | Facility: HOSPITAL | Age: 57
End: 2022-08-24
Payer: COMMERCIAL

## 2022-09-29 RX ORDER — LEVOTHYROXINE SODIUM 88 UG/1
88 TABLET ORAL DAILY
Qty: 90 TABLET | Refills: 3 | OUTPATIENT
Start: 2022-09-29

## 2022-09-29 NOTE — TELEPHONE ENCOUNTER
No new care gaps identified.  [facility] Embedded Care Gaps. Reference number: [id number]. 9/28/2022   8:43:36 PM CDT

## 2022-09-30 DIAGNOSIS — Z00.01 ANNUAL VISIT FOR GENERAL ADULT MEDICAL EXAMINATION WITH ABNORMAL FINDINGS: Primary | ICD-10-CM

## 2022-09-30 RX ORDER — LEVOTHYROXINE SODIUM 88 UG/1
88 TABLET ORAL DAILY
Qty: 90 TABLET | Refills: 3 | OUTPATIENT
Start: 2022-09-30

## 2022-09-30 NOTE — TELEPHONE ENCOUNTER
----- Message from Casper Mcneil sent at 9/30/2022  2:30 PM CDT -----  Type:  Sooner Appointment Request    Caller is requesting a sooner appointment.  Caller declined first available appointment listed below.  Caller will not accept being placed on the waitlist and is requesting a message be sent to doctor.    Name of Caller:  patient  When is the first available appointment?  1/3/23  Symptoms:    Best Call Back Number:  [phone]   Additional Information:  annual visit and need medication refilled.

## 2022-09-30 NOTE — TELEPHONE ENCOUNTER
No new care gaps identified.  [facility] Embedded Care Gaps. Reference number: [id number]. 9/30/2022   2:35:59 PM CDT

## 2022-09-30 NOTE — TELEPHONE ENCOUNTER
----- Message from Casper Mcneil sent at 9/30/2022  2:32 PM CDT -----  Type:  RX Refill Request    Who Called:  patient  Refill or New Rx:  refill  RX Name and Strength:  levothyroxine (SYNTHROID) 88 MCG tablet  How is the patient currently taking it? (ex. 1XDay):    Is this a 30 day or 90 day RX:    Preferred Pharmacy with phone number:    [facility]  [address] [facility] [address] [place] [address]  Phone: [phone] Fax: [phone]     Local or Mail Order:  local  Ordering Provider:  Dr franca Mcduffie Call Back Number:  [phone]   Additional Information:

## 2022-10-03 RX ORDER — LEVOTHYROXINE SODIUM 88 UG/1
88 TABLET ORAL DAILY
Qty: 30 TABLET | Refills: 0 | Status: SHIPPED | OUTPATIENT
Start: 2022-10-03 | End: 2022-10-10

## 2022-10-04 ENCOUNTER — PATIENT MESSAGE (OUTPATIENT)
Dept: FAMILY MEDICINE | Facility: CLINIC | Age: 57
End: 2022-10-04
Payer: COMMERCIAL

## 2022-10-04 NOTE — TELEPHONE ENCOUNTER
Annual appt set up with DR. Pride.  Pt is aware of labs needed, scheduling ticket sent.  Mammogram scheduling ticket sent.

## 2022-10-06 ENCOUNTER — LAB VISIT (OUTPATIENT)
Dept: LAB | Facility: HOSPITAL | Age: 57
End: 2022-10-06
Attending: FAMILY MEDICINE
Payer: COMMERCIAL

## 2022-10-06 DIAGNOSIS — Z00.01 ANNUAL VISIT FOR GENERAL ADULT MEDICAL EXAMINATION WITH ABNORMAL FINDINGS: ICD-10-CM

## 2022-10-06 LAB
ALBUMIN SERPL BCP-MCNC: 3.9 G/DL (ref 3.5–5.2)
ALP SERPL-CCNC: 37 U/L (ref 55–135)
ALT SERPL W/O P-5'-P-CCNC: 31 U/L (ref 10–44)
ANION GAP SERPL CALC-SCNC: 9 MMOL/L (ref 8–16)
AST SERPL-CCNC: 22 U/L (ref 10–40)
BILIRUB SERPL-MCNC: 0.9 MG/DL (ref 0.1–1)
BUN SERPL-MCNC: 15 MG/DL (ref 6–20)
CALCIUM SERPL-MCNC: 9.2 MG/DL (ref 8.7–10.5)
CHLORIDE SERPL-SCNC: 107 MMOL/L (ref 95–110)
CHOLEST SERPL-MCNC: 189 MG/DL (ref 120–199)
CHOLEST/HDLC SERPL: 3.2 {RATIO} (ref 2–5)
CO2 SERPL-SCNC: 24 MMOL/L (ref 23–29)
CREAT SERPL-MCNC: 0.8 MG/DL (ref 0.5–1.4)
EST. GFR  (NO RACE VARIABLE): >60 ML/MIN/1.73 M^2
GLUCOSE SERPL-MCNC: 82 MG/DL (ref 70–110)
HDLC SERPL-MCNC: 59 MG/DL (ref 40–75)
HDLC SERPL: 31.2 % (ref 20–50)
LDLC SERPL CALC-MCNC: 116 MG/DL (ref 63–159)
NONHDLC SERPL-MCNC: 130 MG/DL
POTASSIUM SERPL-SCNC: 4.3 MMOL/L (ref 3.5–5.1)
PROT SERPL-MCNC: 6.9 G/DL (ref 6–8.4)
SODIUM SERPL-SCNC: 140 MMOL/L (ref 136–145)
TRIGL SERPL-MCNC: 70 MG/DL (ref 30–150)
TSH SERPL DL<=0.005 MIU/L-ACNC: 0.63 UIU/ML (ref 0.4–4)

## 2022-10-06 PROCEDURE — 80053 COMPREHEN METABOLIC PANEL: CPT | Performed by: FAMILY MEDICINE

## 2022-10-06 PROCEDURE — 84443 ASSAY THYROID STIM HORMONE: CPT | Performed by: FAMILY MEDICINE

## 2022-10-06 PROCEDURE — 80061 LIPID PANEL: CPT | Performed by: FAMILY MEDICINE

## 2022-10-06 PROCEDURE — 36415 COLL VENOUS BLD VENIPUNCTURE: CPT | Mod: PO | Performed by: FAMILY MEDICINE

## 2022-10-10 ENCOUNTER — OFFICE VISIT (OUTPATIENT)
Dept: FAMILY MEDICINE | Facility: CLINIC | Age: 57
End: 2022-10-10
Payer: COMMERCIAL

## 2022-10-10 VITALS
DIASTOLIC BLOOD PRESSURE: 78 MMHG | HEIGHT: 69 IN | OXYGEN SATURATION: 99 % | BODY MASS INDEX: 21.32 KG/M2 | SYSTOLIC BLOOD PRESSURE: 128 MMHG | WEIGHT: 143.94 LBS | HEART RATE: 73 BPM

## 2022-10-10 DIAGNOSIS — E03.8 OTHER SPECIFIED HYPOTHYROIDISM: ICD-10-CM

## 2022-10-10 DIAGNOSIS — Z00.01 ANNUAL VISIT FOR GENERAL ADULT MEDICAL EXAMINATION WITH ABNORMAL FINDINGS: Primary | ICD-10-CM

## 2022-10-10 DIAGNOSIS — J30.1 SEASONAL ALLERGIC RHINITIS DUE TO POLLEN: ICD-10-CM

## 2022-10-10 PROCEDURE — 3074F PR MOST RECENT SYSTOLIC BLOOD PRESSURE < 130 MM HG: ICD-10-PCS | Mod: CPTII,S$GLB,, | Performed by: FAMILY MEDICINE

## 2022-10-10 PROCEDURE — 3074F SYST BP LT 130 MM HG: CPT | Mod: CPTII,S$GLB,, | Performed by: FAMILY MEDICINE

## 2022-10-10 PROCEDURE — 90686 FLU VACCINE (QUAD) GREATER THAN OR EQUAL TO 3YO PRESERVATIVE FREE IM: ICD-10-PCS | Mod: S$GLB,,, | Performed by: FAMILY MEDICINE

## 2022-10-10 PROCEDURE — 3078F DIAST BP <80 MM HG: CPT | Mod: CPTII,S$GLB,, | Performed by: FAMILY MEDICINE

## 2022-10-10 PROCEDURE — 1160F PR REVIEW ALL MEDS BY PRESCRIBER/CLIN PHARMACIST DOCUMENTED: ICD-10-PCS | Mod: CPTII,S$GLB,, | Performed by: FAMILY MEDICINE

## 2022-10-10 PROCEDURE — 3078F PR MOST RECENT DIASTOLIC BLOOD PRESSURE < 80 MM HG: ICD-10-PCS | Mod: CPTII,S$GLB,, | Performed by: FAMILY MEDICINE

## 2022-10-10 PROCEDURE — 90471 IMMUNIZATION ADMIN: CPT | Mod: S$GLB,,, | Performed by: FAMILY MEDICINE

## 2022-10-10 PROCEDURE — 99396 PREV VISIT EST AGE 40-64: CPT | Mod: 25,S$GLB,, | Performed by: FAMILY MEDICINE

## 2022-10-10 PROCEDURE — 1159F MED LIST DOCD IN RCRD: CPT | Mod: CPTII,S$GLB,, | Performed by: FAMILY MEDICINE

## 2022-10-10 PROCEDURE — 99396 PR PREVENTIVE VISIT,EST,40-64: ICD-10-PCS | Mod: 25,S$GLB,, | Performed by: FAMILY MEDICINE

## 2022-10-10 PROCEDURE — 90471 FLU VACCINE (QUAD) GREATER THAN OR EQUAL TO 3YO PRESERVATIVE FREE IM: ICD-10-PCS | Mod: S$GLB,,, | Performed by: FAMILY MEDICINE

## 2022-10-10 PROCEDURE — 99999 PR PBB SHADOW E&M-EST. PATIENT-LVL III: ICD-10-PCS | Mod: PBBFAC,,, | Performed by: FAMILY MEDICINE

## 2022-10-10 PROCEDURE — 90686 IIV4 VACC NO PRSV 0.5 ML IM: CPT | Mod: S$GLB,,, | Performed by: FAMILY MEDICINE

## 2022-10-10 PROCEDURE — 1160F RVW MEDS BY RX/DR IN RCRD: CPT | Mod: CPTII,S$GLB,, | Performed by: FAMILY MEDICINE

## 2022-10-10 PROCEDURE — 1159F PR MEDICATION LIST DOCUMENTED IN MEDICAL RECORD: ICD-10-PCS | Mod: CPTII,S$GLB,, | Performed by: FAMILY MEDICINE

## 2022-10-10 PROCEDURE — 99999 PR PBB SHADOW E&M-EST. PATIENT-LVL III: CPT | Mod: PBBFAC,,, | Performed by: FAMILY MEDICINE

## 2022-10-10 RX ORDER — LEVOTHYROXINE SODIUM 88 UG/1
88 TABLET ORAL DAILY
Qty: 90 TABLET | Refills: 3 | Status: SHIPPED | OUTPATIENT
Start: 2022-10-10 | End: 2023-10-12

## 2022-10-16 NOTE — PROGRESS NOTES
Assessment:       1. Annual visit for general adult medical examination with abnormal findings    2. Seasonal allergic rhinitis due to pollen    3. Other specified hypothyroidism          Plan:       Annual visit for general adult medical examination with abnormal findings  -     Influenza - Quadrivalent (PF)    Seasonal allergic rhinitis due to pollen:  Stable    Other specified hypothyroidism:  Stable  -     levothyroxine (SYNTHROID) 88 MCG tablet; Take 1 tablet (88 mcg total) by mouth once daily. PLEASE MAKE AN APPOINTMENT FOR NEXT REFILLS.  Dispense: 90 tablet; Refill: 3          Patient agreed with assessment and plan. Patient verbalized understanding.     Subjective:       Patient ID: Kristen Welch is a 57 y.o. female.    Chief Complaint: Annual Exam    HPI    The patient is here today for an annual checkup.  The patient has hypothyroidism and taking levothyroxine, denies any side effects of the medicine, the last TSH levels were therapeutic.  The patient also has chronic allergies and taking cetirizine as needed.  The patient denies any symptoms of chest pain and shortness of breath at this office visit.  Upon review of the last blood work, the patient kidney function, liver enzymes and cholesterol levels were in good range.    Past medical history, past social history was reviewed and discussed with the patient.    Review of Systems   Constitutional:  Negative for activity change, appetite change and chills.   HENT:  Negative for ear discharge and sore throat.    Eyes:  Negative for discharge and itching.   Respiratory:  Negative for choking and chest tightness.    Cardiovascular:  Negative for chest pain and leg swelling.   Gastrointestinal:  Negative for abdominal distention and abdominal pain.   Endocrine: Negative for cold intolerance and heat intolerance.   Genitourinary:  Negative for dysuria and flank pain.   Musculoskeletal:  Negative for arthralgias and back pain.   Skin:  Negative for pallor and  rash.   Allergic/Immunologic: Negative for environmental allergies and food allergies.   Neurological:  Negative for dizziness, facial asymmetry and headaches.   Hematological:  Negative for adenopathy. Does not bruise/bleed easily.   Psychiatric/Behavioral:  Negative for agitation, confusion and sleep disturbance.      Objective:      Physical Exam  Vitals and nursing note reviewed.   Constitutional:       General: She is not in acute distress.     Appearance: Normal appearance. She is well-developed and normal weight. She is not diaphoretic.   HENT:      Head: Normocephalic and atraumatic.      Right Ear: External ear normal.      Left Ear: External ear normal.      Nose: Nose normal.      Mouth/Throat:      Pharynx: No oropharyngeal exudate.   Eyes:      General: No scleral icterus.        Right eye: No discharge.         Left eye: No discharge.      Conjunctiva/sclera: Conjunctivae normal.      Pupils: Pupils are equal, round, and reactive to light.   Cardiovascular:      Rate and Rhythm: Normal rate and regular rhythm.      Heart sounds: Normal heart sounds.   Pulmonary:      Effort: Pulmonary effort is normal. No respiratory distress.      Breath sounds: Normal breath sounds. No wheezing.   Abdominal:      General: There is no distension.      Palpations: There is no mass.      Tenderness: There is no abdominal tenderness.   Musculoskeletal:         General: No tenderness or deformity.      Cervical back: Neck supple.   Skin:     Coloration: Skin is not pale.      Findings: No erythema.   Neurological:      Mental Status: She is alert.      Cranial Nerves: No cranial nerve deficit.      Coordination: Coordination normal.   Psychiatric:         Behavior: Behavior normal.         Thought Content: Thought content normal.         Judgment: Judgment normal.

## 2022-10-25 ENCOUNTER — HOSPITAL ENCOUNTER (OUTPATIENT)
Dept: RADIOLOGY | Facility: HOSPITAL | Age: 57
Discharge: HOME | End: 2022-10-25
Attending: FAMILY MEDICINE
Payer: COMMERCIAL

## 2022-10-25 DIAGNOSIS — Z12.31 OTHER SCREENING MAMMOGRAM: ICD-10-CM

## 2022-10-25 PROCEDURE — 77067 SCR MAMMO BI INCL CAD: CPT | Mod: 26,,, | Performed by: RADIOLOGY

## 2022-10-25 PROCEDURE — 77063 MAMMO DIGITAL SCREENING BILAT WITH TOMO: ICD-10-PCS | Mod: 26,,, | Performed by: RADIOLOGY

## 2022-10-25 PROCEDURE — 77063 BREAST TOMOSYNTHESIS BI: CPT | Mod: TC,PO

## 2022-10-25 PROCEDURE — 77063 BREAST TOMOSYNTHESIS BI: CPT | Mod: 26,,, | Performed by: RADIOLOGY

## 2022-10-25 PROCEDURE — 77067 MAMMO DIGITAL SCREENING BILAT WITH TOMO: ICD-10-PCS | Mod: 26,,, | Performed by: RADIOLOGY

## 2023-02-16 ENCOUNTER — OFFICE VISIT (OUTPATIENT)
Dept: OPTOMETRY | Facility: CLINIC | Age: 58
End: 2023-02-16
Payer: COMMERCIAL

## 2023-02-16 DIAGNOSIS — H52.203 HYPEROPIA WITH ASTIGMATISM AND PRESBYOPIA, BILATERAL: ICD-10-CM

## 2023-02-16 DIAGNOSIS — Z01.00 EXAMINATION OF EYES AND VISION: Primary | ICD-10-CM

## 2023-02-16 DIAGNOSIS — Z46.0 CONTACT LENS/GLASSES FITTING: ICD-10-CM

## 2023-02-16 DIAGNOSIS — H52.03 HYPEROPIA WITH ASTIGMATISM AND PRESBYOPIA, BILATERAL: ICD-10-CM

## 2023-02-16 DIAGNOSIS — H43.393 VITREOUS FLOATERS, BILATERAL: ICD-10-CM

## 2023-02-16 DIAGNOSIS — Z13.5 GLAUCOMA SCREENING: ICD-10-CM

## 2023-02-16 DIAGNOSIS — Z97.3 WEARS CONTACT LENSES: ICD-10-CM

## 2023-02-16 DIAGNOSIS — H52.4 HYPEROPIA WITH ASTIGMATISM AND PRESBYOPIA, BILATERAL: ICD-10-CM

## 2023-02-16 DIAGNOSIS — Z46.0 CONTACT LENS/GLASSES FITTING: Primary | ICD-10-CM

## 2023-02-16 PROCEDURE — 99999 PR PBB SHADOW E&M-EST. PATIENT-LVL III: ICD-10-PCS | Mod: PBBFAC,,, | Performed by: OPTOMETRIST

## 2023-02-16 PROCEDURE — 92014 PR EYE EXAM, EST PATIENT,COMPREHESV: ICD-10-PCS | Mod: S$GLB,,, | Performed by: OPTOMETRIST

## 2023-02-16 PROCEDURE — 1159F PR MEDICATION LIST DOCUMENTED IN MEDICAL RECORD: ICD-10-PCS | Mod: CPTII,S$GLB,, | Performed by: OPTOMETRIST

## 2023-02-16 PROCEDURE — 92310 PR CONTACT LENS FITTING (NO CHANGE): ICD-10-PCS | Mod: S$GLB,,, | Performed by: OPTOMETRIST

## 2023-02-16 PROCEDURE — 92014 COMPRE OPH EXAM EST PT 1/>: CPT | Mod: S$GLB,,, | Performed by: OPTOMETRIST

## 2023-02-16 PROCEDURE — 1159F MED LIST DOCD IN RCRD: CPT | Mod: CPTII,S$GLB,, | Performed by: OPTOMETRIST

## 2023-02-16 PROCEDURE — 92015 DETERMINE REFRACTIVE STATE: CPT | Mod: S$GLB,,, | Performed by: OPTOMETRIST

## 2023-02-16 PROCEDURE — 99999 PR PBB SHADOW E&M-EST. PATIENT-LVL III: CPT | Mod: PBBFAC,,, | Performed by: OPTOMETRIST

## 2023-02-16 PROCEDURE — 92310 CONTACT LENS FITTING OU: CPT | Mod: S$GLB,,, | Performed by: OPTOMETRIST

## 2023-02-16 PROCEDURE — 99499 NO LOS: ICD-10-PCS | Mod: S$GLB,,, | Performed by: OPTOMETRIST

## 2023-02-16 PROCEDURE — 99499 UNLISTED E&M SERVICE: CPT | Mod: S$GLB,,, | Performed by: OPTOMETRIST

## 2023-02-16 PROCEDURE — 92015 PR REFRACTION: ICD-10-PCS | Mod: S$GLB,,, | Performed by: OPTOMETRIST

## 2023-02-16 NOTE — PATIENT INSTRUCTIONS
"DRY EYES -- BURNING OR JUAN MIGUEL SYMPTOMS:  Use Over The Counter artificial tears as needed for dry eye symptoms.   Some common brands include:  Systane, Optive, Refresh, and Thera-Tears.  These drops can be used as frequently as desired, but may be most helpful use during long periods of concentrated work.  For example, reading / working at the computer. Start with 3-4x per day.     Nighttime Ophthalmic gel or ointments are available: Refresh PM, Genteal, and Lacrilube.    Avoid drops that "get redness out" (Visine, Murine, Clear Eyes), as these may contain medication that could further irritate the eyes, especially with chronic use.    ALLERGY EYES -- ITCHING SYMPTOMS:  Over the counter medications include--Pataday, Zaditor, and Alaway.  Use as directed 1-2 drops daily for symptoms of itching / watering eyes.  These drops will not help for dry eye or exposure symptoms.    REDNESS RELIEF:  Lumify---is a good redness reliever that will not cause irritation if used chronically.          NEW WEARER -- DISPENSE TRIAL CONTACT LENSES  Reviewed insertion/removal and cleaning with .  Gave sample solutions.  Build up wearing time 6+ hours first day, adding 1-2 hours per day, up to all waking hours.  Pt advised to use NP or CL rewetting gtts prn for dryness.  REMOVE LENSES AND CALL OUR OFFICE IF REDNESS, PAIN, OR BLURRED VISION PERSISTS MORE THAN 12 HOURS.   FLASHES / FLOATERS / POSTERIOR VITREOUS DETACHMENT    Call the clinic if you have any further changes in symptoms.  Including:  Increased numbers of floaters or flashing lights, dimness or darkness that moves through or stays constant in your vision, or any pain in the eye (s).    You may sometimes see small specks or clouds moving in your field of vision.  They are called FLOATERS.  You can often see them when looking at a plain background, like a blank wall or blue cornel.  Floaters are actually tiny clumps of gel or cells inside the VITREOUS, the clear jelly-like " fluid that fills the inside of your eye.    While these objects look like they are in front of your eye, they are actually floating inside.  What you see are the shadows they cast on the RETINA, the nerve layer at the back of the eye that senses light and allows you to see.      POSTERIOR VITREOUS DETACHMENT    The appearance of new floaters may be alarming.  If you suddenly develop new floaters, you should contact your eye care professional  right away.    The retina can tear if the shrinking vitreous pulls away from the wall of the eye.  This sometimes causes a small amount of bleeding in the eye that may appear as new floaters.    A torn retina is always a serious problem, since it can lead to a retinal detachment.  You should see your eye care professional as soon as possible if:    even one new floater appears suddenly;  you see sudden flashes of light;  you notice other symptoms, like the loss of side vision, or a curtain closes down in your vision        POSTERIOR VITREOUS DETACHMENT is more common for people who:    are nearsighted;  have had cataract surgery;  have had YAG laser surgery of the eye;  have had inflammation inside the eye;  are over age 60.      While some floaters may remain visible, many of them will fade over time and become less noticeable.  Even if you've had some floaters for years, you should have your eyes checked as soon as possible if you notice new ones.    FLASHING LIGHTS    When the vitreous gel rubs or pulls on the retina, you may see what look like flashing lights or lightning streaks.  These flashes can appear off and on for several weeks or months.      Some people experience flashes of light that appear as jagged lines or heat waves in both eyes, lasting 10-20 minutes.  These flashes are caused by a spasm of blood vessels in the brain, which is called a migraine.    If a headache follows these flashes, it's called a migraine headache.  If   no headache occurs, these  flashes are called Ophthalmic or Ocular Migraine.

## 2023-02-16 NOTE — PROGRESS NOTES
HPI    Dle- 11/10/2021    Pt here routine eye exam and contact fitting. Pt sts no changes to [facility]. Pt   would like to discuss contacts, first time wear, open to monthlies or   dailies. Pt denies floaters, flashes, pain. Pt uses gtts OTC Visine prn.   Last edited by Renetta Anguiano MA on 2/16/2023  2:49 PM.        ROS    Positive for: Eyes  Negative for: Constitutional, Gastrointestinal, Neurological, Skin,   Genitourinary, Musculoskeletal, HENT, Endocrine, Cardiovascular,   Respiratory, Psychiatric, Allergic/Imm, Heme/Lymph  Last edited by ASTRID Cardoso, OD on 2/16/2023  3:11 PM.        Assessment /Plan     For exam results, see Encounter Report.    Examination of eyes and vision    Vitreous floaters, bilateral    Glaucoma screening    Hyperopia with astigmatism and presbyopia, bilateral    Contact lens/glasses fitting    Wears contact lenses      Ocular health exam OU   Mild OU w/ moderate Rx---RD precautions given and reviewed. Patient knows to call/ message if any further changes in symptoms occur.  Not suspect  Update specs rx, gave copy, fill prn   Updated w/ trials for new wearer--MF air optix and optifree       IR today---will order new trials and f/u prn to finalize     NEW WEARER -- DISPENSE TRIAL CONTACT LENSES  Reviewed insertion/removal and cleaning with .  Gave sample solutions.  Build up wearing time 6+ hours first day, adding 1-2 hours per day, up to all waking hours.  Pt advised to use NP or CL rewetting gtts prn for dryness.  REMOVE LENSES AND CALL OUR OFFICE IF REDNESS, PAIN, OR BLURRED VISION PERSISTS MORE THAN 12 HOURS.    Discussed and educated patient on current findings /plan.  RTC 1 year, prn if any changes / issues

## 2023-08-14 DIAGNOSIS — M79.671 RIGHT FOOT PAIN: Primary | ICD-10-CM

## 2023-08-15 ENCOUNTER — HOSPITAL ENCOUNTER (OUTPATIENT)
Dept: RADIOLOGY | Facility: HOSPITAL | Age: 58
Discharge: HOME | End: 2023-08-15
Attending: ORTHOPAEDIC SURGERY
Payer: COMMERCIAL

## 2023-08-15 ENCOUNTER — OFFICE VISIT (OUTPATIENT)
Dept: ORTHOPEDICS | Facility: CLINIC | Age: 58
End: 2023-08-15
Payer: COMMERCIAL

## 2023-08-15 DIAGNOSIS — M79.671 RIGHT FOOT PAIN: ICD-10-CM

## 2023-08-15 DIAGNOSIS — S93.491A SPRAIN OF ANTERIOR TALOFIBULAR LIGAMENT OF RIGHT ANKLE, INITIAL ENCOUNTER: Primary | ICD-10-CM

## 2023-08-15 DIAGNOSIS — S93.421A SPRAIN OF DELTOID LIGAMENT OF RIGHT ANKLE, INITIAL ENCOUNTER: ICD-10-CM

## 2023-08-15 PROCEDURE — 99203 PR OFFICE/OUTPT VISIT, NEW, LEVL III, 30-44 MIN: ICD-10-PCS | Mod: S$GLB,,, | Performed by: ORTHOPAEDIC SURGERY

## 2023-08-15 PROCEDURE — 99999 PR PBB SHADOW E&M-EST. PATIENT-LVL III: CPT | Mod: PBBFAC,,, | Performed by: ORTHOPAEDIC SURGERY

## 2023-08-15 PROCEDURE — 73610 XR ANKLE COMPLETE 3 VIEW RIGHT: ICD-10-PCS | Mod: 26,RT,, | Performed by: RADIOLOGY

## 2023-08-15 PROCEDURE — 73630 X-RAY EXAM OF FOOT: CPT | Mod: 26,RT,, | Performed by: RADIOLOGY

## 2023-08-15 PROCEDURE — 1160F RVW MEDS BY RX/DR IN RCRD: CPT | Mod: CPTII,S$GLB,, | Performed by: ORTHOPAEDIC SURGERY

## 2023-08-15 PROCEDURE — 1159F MED LIST DOCD IN RCRD: CPT | Mod: CPTII,S$GLB,, | Performed by: ORTHOPAEDIC SURGERY

## 2023-08-15 PROCEDURE — 99999 PR PBB SHADOW E&M-EST. PATIENT-LVL III: ICD-10-PCS | Mod: PBBFAC,,, | Performed by: ORTHOPAEDIC SURGERY

## 2023-08-15 PROCEDURE — 1159F PR MEDICATION LIST DOCUMENTED IN MEDICAL RECORD: ICD-10-PCS | Mod: CPTII,S$GLB,, | Performed by: ORTHOPAEDIC SURGERY

## 2023-08-15 PROCEDURE — 73630 X-RAY EXAM OF FOOT: CPT | Mod: TC,PO,RT

## 2023-08-15 PROCEDURE — 73610 X-RAY EXAM OF ANKLE: CPT | Mod: 26,RT,, | Performed by: RADIOLOGY

## 2023-08-15 PROCEDURE — 73610 X-RAY EXAM OF ANKLE: CPT | Mod: TC,PO,RT

## 2023-08-15 PROCEDURE — 99203 OFFICE O/P NEW LOW 30 MIN: CPT | Mod: S$GLB,,, | Performed by: ORTHOPAEDIC SURGERY

## 2023-08-15 PROCEDURE — 73630 XR FOOT COMPLETE 3 VIEW RIGHT: ICD-10-PCS | Mod: 26,RT,, | Performed by: RADIOLOGY

## 2023-08-15 PROCEDURE — 1160F PR REVIEW ALL MEDS BY PRESCRIBER/CLIN PHARMACIST DOCUMENTED: ICD-10-PCS | Mod: CPTII,S$GLB,, | Performed by: ORTHOPAEDIC SURGERY

## 2023-08-22 LAB
HUMAN PAPILLOMAVIRUS (HPV): NORMAL
PAP RECOMMENDATION EXT: NORMAL
PAP SMEAR: NORMAL

## 2023-08-24 NOTE — PROGRESS NOTES
Status/Diagnosis: Right ATFL/Deltoid ligament sprain.  Date of Surgery: none  Date of Injury: 08/11/2023  Return visit: PRN   X-rays on Return: pending patient complaint    Chief Complaint:   Chief Complaint   Patient presents with    Right Foot - Pain, Injury     Present History:  Kristen Welch is a 58 y.o. female who presents who sustained a right ankle injury last week.  Immediate pain and swelling but still able to bear weight.  No recent injury or complaints chronic ankle instability.  Denies any numbness or tingling.      Past Medical History:   Diagnosis Date    Thyroid disease        Past Surgical History:   Procedure Laterality Date    AUGMENTATION OF BREAST Bilateral     age 42    BREAST SURGERY         Current Outpatient Medications   Medication Sig    cetirizine (ZYRTEC) 10 MG tablet Take 10 mg by mouth once daily.    levothyroxine (SYNTHROID) 88 MCG tablet Take 1 tablet (88 mcg total) by mouth once daily. PLEASE MAKE AN APPOINTMENT FOR NEXT REFILLS.    predniSONE (DELTASONE) 20 MG tablet Take 1 tablet (20 mg total) by mouth 2 (two) times a day.    progesterone (PROMETRIUM) 200 MG capsule Take 1 capsule (200 mg total) by mouth daily at bedtime.     No current facility-administered medications for this visit.       Review of patient's allergies indicates:   Allergen Reactions    Pcn [penicillins]        Family History   Adopted: Yes       Social History     Socioeconomic History    Marital status:    Tobacco Use    Smoking status: Never    Smokeless tobacco: Never   Substance and Sexual Activity    Alcohol use: Yes     Comment: every other day    Drug use: Never    Sexual activity: Yes     Social Determinants of Health     Stress: No Stress Concern Present (9/24/2019)    [employer] - Occupational Stress Questionnaire     Feeling of Stress : Only a little       Physical exam:  There were no vitals filed for this visit.  There is no height or weight on file to  calculate BMI.  General: In no apparent distress; well developed and well nourished.  HEENT: normocephalic; atraumatic.  Cardiovascular: regular rate.  Respiratory: no increased work of breathing.  Musculoskeletal:   Gait: antalgic  Inspection:   Moderate diffuse swelling involving the right ankle, lateral> medial.  Tenderness at ATFL as well the deltoid origin.  Equivocal laxity on anterior drawer and external rotation anterior drawer testing due to patient pain/guarding.  No pain referable to the foot.  Silfverskiold: negative  Alignment:  Knee: neutral               Ankle: neutral              Hindfoot: neutral              Forefoot: neutral   Strength:              Dorsiflexion 5/5  Plantar flexion 5/5  Inversion 5/5   Eversion 5/5   Sensation:              SILT distally   ROM:              Ankle and subtalar: near full with pain at extremes  Pulses: 2+ DP/PT pulses.                   Imaging Studies/Outside documentation:  I have ordered/reviewed/interpreted the following images/outside documentation:  1. Weight bearing 3-views of Right foot and ankle:   On my independent review, no acute bony abnormality noted.  Chronic appearing os involving the area just medial to the medial malleolus.  Suspicious for chronic avulsion fracture of the deltoid ligament origin.  Incidental os trigonum.  Ankle mortise remains congruent.  No significant degenerative joint changes.        Assessment:  Kristen Welch is a 58 y.o. female with Right ATFL/Deltoid ligament sprain.     Plan:   Clinical and radiographic findings were discussed.  Recommend conservative management to include rest, ice, compression, elevation.  Over-the-counter oral NSAIDs as needed for pain.  We will also initiate physical therapy per low ankle sprain protocol next 7-14 days once soft tissue swelling has improved.  Patient will call regarding future follow-up should symptoms persist or worsen.  Consider MRI right ankle without contrast should this  occur.    We performed a custom orthotic/brace fitting, adjusting and training with the patient. The patient demonstrated understanding and proper care. This was performed for 15 minutes.      This note was created using voice recognition software and may contain grammatical errors.

## 2023-08-25 ENCOUNTER — CLINICAL SUPPORT (OUTPATIENT)
Dept: REHABILITATION | Facility: HOSPITAL | Age: 58
End: 2023-08-25
Attending: ORTHOPAEDIC SURGERY
Payer: COMMERCIAL

## 2023-08-25 ENCOUNTER — TELEPHONE (OUTPATIENT)
Dept: FAMILY MEDICINE | Facility: CLINIC | Age: 58
End: 2023-08-25
Payer: COMMERCIAL

## 2023-08-25 DIAGNOSIS — M62.81 MUSCLE WEAKNESS: ICD-10-CM

## 2023-08-25 DIAGNOSIS — S93.421A SPRAIN OF DELTOID LIGAMENT OF RIGHT ANKLE, INITIAL ENCOUNTER: ICD-10-CM

## 2023-08-25 DIAGNOSIS — S93.491A SPRAIN OF ANTERIOR TALOFIBULAR LIGAMENT OF RIGHT ANKLE, INITIAL ENCOUNTER: ICD-10-CM

## 2023-08-25 DIAGNOSIS — M25.571 ACUTE RIGHT ANKLE PAIN: Primary | ICD-10-CM

## 2023-08-25 DIAGNOSIS — M25.60 DECREASED RANGE OF MOTION: ICD-10-CM

## 2023-08-25 PROCEDURE — 97161 PT EVAL LOW COMPLEX 20 MIN: CPT | Mod: PN | Performed by: PHYSICAL THERAPIST

## 2023-08-25 PROCEDURE — 97110 THERAPEUTIC EXERCISES: CPT | Mod: PN | Performed by: PHYSICAL THERAPIST
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
## 2023-08-25 NOTE — TELEPHONE ENCOUNTER
----- Message from Demetria Lundberg sent at 8/25/2023  8:02 AM CDT -----  Regarding: Needs office to send mammogram  Type: Needs Medical Advice  Who Called:  Kristen    Froy Call Back Number: [phone]    Additional Information: Pt needs return call so that she can get the office to send over her mammogram and clinic notes to her new provider dr long, please call and she will give the fax number

## 2023-08-30 ENCOUNTER — CLINICAL SUPPORT (OUTPATIENT)
Dept: REHABILITATION | Facility: HOSPITAL | Age: 58
End: 2023-08-30
Payer: COMMERCIAL

## 2023-08-30 DIAGNOSIS — M25.671 DECREASED RANGE OF MOTION OF RIGHT ANKLE: ICD-10-CM

## 2023-08-30 DIAGNOSIS — S93.491A SPRAIN OF ANTERIOR TALOFIBULAR LIGAMENT OF RIGHT ANKLE, INITIAL ENCOUNTER: ICD-10-CM

## 2023-08-30 DIAGNOSIS — S93.421A SPRAIN OF DELTOID LIGAMENT OF RIGHT ANKLE, INITIAL ENCOUNTER: ICD-10-CM

## 2023-08-30 DIAGNOSIS — M25.571 ACUTE RIGHT ANKLE PAIN: ICD-10-CM

## 2023-08-30 DIAGNOSIS — M62.81 MUSCLE WEAKNESS: ICD-10-CM

## 2023-08-30 PROCEDURE — 97530 THERAPEUTIC ACTIVITIES: CPT | Mod: PN,CQ

## 2023-08-30 PROCEDURE — 97140 MANUAL THERAPY 1/> REGIONS: CPT | Mod: PN,CQ

## 2023-08-30 PROCEDURE — 97110 THERAPEUTIC EXERCISES: CPT | Mod: PN,CQ

## 2023-08-30 NOTE — PROGRESS NOTES
[facility]   Physical Therapy Treatment Note      Name: Kristen Welch  Clinic Number: [id number]    Therapy Diagnosis:   Encounter Diagnoses   Name Primary?    Acute right ankle pain     Sprain of anterior talofibular ligament of right ankle, initial encounter     Sprain of deltoid ligament of right ankle, initial encounter     Decreased range of motion of right ankle     Muscle weakness      Physician: Tj Servin MD    Visit Date: 8/30/2023    Physician Orders: PT Eval and Treat 2-3 times per week for 6 to 8 weeks per low ankle sprain protocol.  Atfl AND deltoid ligament.  Medical Diagnosis from Referral:   S93.491A (ICD-10-CM) - Sprain of anterior talofibular ligament of right ankle, initial encounter   S93.421A (ICD-10-CM) - Sprain of deltoid ligament of right ankle, initial encounter      Evaluation Date: 8/25/2023  Authorization Period Expiration: 8-23-24  Plan of Care Expiration: 10-20-23  Progress Note Due: 9-25-23  Visit # / Visits authorized: 1/20 +eval  FOTO: Issued Visit #: 1     PTA Visit #: 1/5     MD Follow up appointment: none scheduled     Precautions: Standard and , low ankle sprain precautions for deltoid ligament and ATFL      Time In: 1:50  Time Out: 2:28  Total Billable Time: 38 minutes      Subjective     Patient reports: doing a little better, having less pain overall, worsens with movements. Brace still feels uncomfortable at times to B malleoli.  She was compliant with home exercise program.  Response to previous treatment: felt good  Functional change: less pain, seems less swollen    Pain: 1/10  Location: right ankle    Objective      Objective Measures updated at progress report unless specified.     Treatment     Kristen received the treatments listed below:      therapeutic exercises to develop strength, endurance, ROM, and flexibility for 12 minutes including:  Emphasis on pain free zone   Ankle pumps x 10  Ankle circles x 10 CW and CCW   GSS with  strap x 10  Toe crunches 2 min  Toe splay x 15   Toe flexion/extension over edge of foam for full AROM x 15    manual therapy techniques: Joint Mobilizations, Soft tissue Mobilization and effleurage were applied to the: Right ankle for 15 minutes, including:  Gentle passive ROM with STM as needed  Gentle effleurage for swelling management    neuromuscular re-education activities to improve: Balance, Coordination, Proprioception, and Motor Control for 00 minutes. The following activities were included:  May add at later session    therapeutic activities to improve functional performance for 11 minutes, including: discussed shoe wear and pt is wearing flat sandals with no support Pt reports she has some Birkenstock sandals.  Instructed pt to try those and also suggested pt try an athletic shoe that might give further support   Standing weight shifts to tolerance for weight bearing; x 10 lateral, x 10 each diagonal (brace off)  1/2 kneeling closed chain ankle dorsiflexion x 10 with 5s hold for gentle ROM within tolerance    Instruction on use of cold pack and elevation as needed for pain and swelling control.    Patient Education and Home Exercises       Education provided:   - stretching to point of tightness not pain  - comfortable range of motion with all exercises  - importance of compliance to HEP    Written Home Exercises Provided: yes. Exercises were reviewed and Kristen was able to demonstrate them prior to the end of the session.  Kristen demonstrated good  understanding of the education provided. See Electronic Medical Record under Patient Instructions for exercises provided during therapy sessions    Assessment     Patient presents with continued edema to the right ankle but did lessen with manual techniques. Continues with tightness into end range dorsiflexion and mild pain with plantar flexion, both improved following closed chain ankle dorsiflexion in half kneeling. Tolerated progression of exercises very  well with reduced pain and stiffness by end of session. Understands importance of continued cold pack and elevation at home.     Kristen Is progressing well towards her goals.   Patient prognosis is Good.     Patient will continue to benefit from skilled outpatient physical therapy to address the deficits listed in the problem list box on initial evaluation, provide pt/family education and to maximize pt's level of independence in the home and community environment.     Patient's spiritual, cultural and educational needs considered and pt agreeable to plan of care and goals.     Anticipated barriers to physical therapy: none    Goals:   Short Term Goals:  4 weeks  Increase range of motion 25%  Increase strength 1/2 muscle grade  Be able to perform HEP with minimal cueing required  Decrease swelling of mid malleoli girth 0.5 cm     Long Term Goals: 8 weeks  Increase range of motion to 75% to 100% full   Improve muscle strength 1 muscle grade  Improve muscle strength with MMT to 4+/5 to 5/5  Decrease swelling to min-0 level  Restore ability to ambulate with normal pain free gait without brace   Walking for ADL, work  and exercise will be restored without increased pain  Restore ability to participate in an exercise program for Wellness   Restore ability to stand for ADL and work without increased pain  Restore ability to climb stairs in a reciprocal manner with min to 0 increased pain and/or difficulty  Restore ability to perform ADL's and household activities independently and without increased pain    Plan     Continue with current POC toward established physical therapy goals.    Enma Coello, PTA

## 2023-09-05 ENCOUNTER — CLINICAL SUPPORT (OUTPATIENT)
Dept: REHABILITATION | Facility: HOSPITAL | Age: 58
End: 2023-09-05
Payer: COMMERCIAL

## 2023-09-05 DIAGNOSIS — M25.671 DECREASED RANGE OF MOTION OF RIGHT ANKLE: ICD-10-CM

## 2023-09-05 DIAGNOSIS — M62.81 MUSCLE WEAKNESS: ICD-10-CM

## 2023-09-05 DIAGNOSIS — S93.421S SPRAIN OF DELTOID LIGAMENT OF RIGHT ANKLE, SEQUELA: ICD-10-CM

## 2023-09-05 DIAGNOSIS — M25.571 ACUTE RIGHT ANKLE PAIN: Primary | ICD-10-CM

## 2023-09-05 DIAGNOSIS — S93.491A SPRAIN OF ANTERIOR TALOFIBULAR LIGAMENT OF RIGHT ANKLE, INITIAL ENCOUNTER: ICD-10-CM

## 2023-09-05 PROCEDURE — 97110 THERAPEUTIC EXERCISES: CPT | Mod: PN

## 2023-09-05 PROCEDURE — 97530 THERAPEUTIC ACTIVITIES: CPT | Mod: PN

## 2023-09-05 PROCEDURE — 97140 MANUAL THERAPY 1/> REGIONS: CPT | Mod: PN

## 2023-09-05 NOTE — PROGRESS NOTES
[facility]   Physical Therapy Treatment Note      Name: Kristen Welch  Clinic Number: [id number]    Therapy Diagnosis:   Encounter Diagnoses   Name Primary?    Acute right ankle pain Yes    Sprain of anterior talofibular ligament of right ankle, initial encounter     Sprain of deltoid ligament of right ankle, sequela     Decreased range of motion of right ankle     Muscle weakness      Physician: Tj Servin MD    Visit Date: 9/5/2023    Physician Orders: PT Eval and Treat 2-3 times per week for 6 to 8 weeks per low ankle sprain protocol.  Atfl AND deltoid ligament.  Medical Diagnosis from Referral:   S93.491A (ICD-10-CM) - Sprain of anterior talofibular ligament of right ankle, initial encounter   S93.421A (ICD-10-CM) - Sprain of deltoid ligament of right ankle, initial encounter      Evaluation Date: 8/25/2023  Authorization Period Expiration: 8-23-24  Plan of Care Expiration: 10-20-23  Progress Note Due: 9-25-23  Visit # / Visits authorized: 2/20 +eval  FOTO: Issued Visit #: 1     PTA Visit #: 0/5     MD Follow up appointment: none scheduled     Precautions: Standard and , low ankle sprain precautions for deltoid ligament and ATFL      Time In: 10 am  Time Out: 10:45 am  Total Billable Time: 45 minutes      Subjective     Patient reports: ankle feel stable, just sore. Still with a lot of swelling. Wants to know if she can start walking again and/or riding a bike.  She was compliant with home exercise program.  Response to previous treatment: felt good  Functional change: less pain, seems less swollen    Pain: 1/10  Location: right ankle    Objective      Objective Measures updated at progress report unless specified.     Treatment     Kristen received the treatments listed below:      therapeutic exercises to develop strength, endurance, ROM, and flexibility for 18 minutes including:  Emphasis on pain free zone   Ankle DF/PF over 1/2 foam roll x 1 min  Ankle circles x 10 CW and  "CCW   GSS with strap 15"H x 5  Toe crunches 2 min  Towel inversion 1 min  Towel eversion 1 min  Toe splay x 15   Toe flexion/extension over edge of foam for full AROM x 1 min  Ankle 4 way yellow thera-band x 30 ea  Standing heel raises x 15    manual therapy techniques: Joint Mobilizations, Soft tissue Mobilization and effleurage were applied to the: Right ankle for 11 minutes, including:  Gentle passive ROM with STM as needed  Gentle effleurage for swelling management    neuromuscular re-education activities to improve: Balance, Coordination, Proprioception, and Motor Control for 00 minutes. The following activities were included:  May add at later session    therapeutic activities to improve functional performance for 16 minutes, including: discussed shoe wear and pt is wearing flat sandals with no support Pt reports she has some Birkenstock sandals.  Instructed pt to try those and also suggested pt try an athletic shoe that might give further support   Standing weight shifts to tolerance for weight bearing; x 10 lateral, x 10 each diagonal (brace off)  1/2 kneeling closed chain ankle dorsiflexion x 10 with 5s hold for gentle ROM within tolerance  Single limb stance right lower extremity Airex 15"H x 3  Rec bike lv 3-4 x 5 min    Instruction on use of cold pack and elevation as needed for pain and swelling control.    Patient Education and Home Exercises       Education provided:   - stretching to point of tightness not pain  - comfortable range of motion with all exercises  - importance of compliance to HEP    Written Home Exercises Provided: yes. Exercises were reviewed and Kristen was able to demonstrate them prior to the end of the session.  Kristen demonstrated good  understanding of the education provided. See Electronic Medical Record under Patient Instructions for exercises provided during therapy sessions    Assessment     Patient continues with swelling throughout right ankle but this is having minimal " impact on range. Patient advised to refrain from riding her bike at home for safety and we will incorporate stationary bike in therapy. Advised she may begin walking starting with 1/4 mile and progressing after 3 days if no adverse effects noted. Patient with good tolerance to treatment this date. Able to progress without complaint     Kristen Is progressing well towards her goals.   Patient prognosis is Good.     Patient will continue to benefit from skilled outpatient physical therapy to address the deficits listed in the problem list box on initial evaluation, provide pt/family education and to maximize pt's level of independence in the home and community environment.     Patient's spiritual, cultural and educational needs considered and pt agreeable to plan of care and goals.     Anticipated barriers to physical therapy: none    Goals:   Short Term Goals:  4 weeks  Increase range of motion 25%  Increase strength 1/2 muscle grade  Be able to perform HEP with minimal cueing required  Decrease swelling of mid malleoli girth 0.5 cm     Long Term Goals: 8 weeks  Increase range of motion to 75% to 100% full   Improve muscle strength 1 muscle grade  Improve muscle strength with MMT to 4+/5 to 5/5  Decrease swelling to min-0 level  Restore ability to ambulate with normal pain free gait without brace   Walking for ADL, work  and exercise will be restored without increased pain  Restore ability to participate in an exercise program for Wellness   Restore ability to stand for ADL and work without increased pain  Restore ability to climb stairs in a reciprocal manner with min to 0 increased pain and/or difficulty  Restore ability to perform ADL's and household activities independently and without increased pain    Plan     Continue with current POC toward established physical therapy goals.    Hilary Lyons, PT

## 2023-09-08 NOTE — PROGRESS NOTES
[facility]   Physical Therapy Treatment Note      Name: Kristen Welch  [facility] Number: [id number]    Therapy Diagnosis:   Encounter Diagnoses   Name Primary?    Acute right ankle pain Yes    Sprain of anterior talofibular ligament of right ankle, subsequent encounter     Sprain of deltoid ligament of right ankle, sequela     Decreased range of motion of right ankle     Muscle weakness      Physician: Tj Servin MD    Visit Date: 9/11/2023    Physician Orders: PT Eval and Treat 2-3 times per week for 6 to 8 weeks per low ankle sprain protocol.  Atfl AND deltoid ligament.  Medical Diagnosis from Referral:   S93.491A (ICD-10-CM) - Sprain of anterior talofibular ligament of right ankle, initial encounter   S93.421A (ICD-10-CM) - Sprain of deltoid ligament of right ankle, initial encounter      Evaluation Date: 8/25/2023  Authorization Period Expiration: 8-23-24  Plan of Care Expiration: 10-20-23  Progress Note Due: 9-25-23  Visit # / Visits authorized: 3/20 +eval  FOTO: Issued Visit #: 1     PTA Visit #: 0/5     MD Follow up appointment: none scheduled     Precautions: Standard and , low ankle sprain precautions for deltoid ligament and ATFL      Time In: 2:33  Time Out: 2:23  Total Billable Time: 50 minutes      Subjective     Patient reports: she still getting swelling and has warmth in ankle still  Pt states last few morning waking up and first few steps with no pain   She was compliant with home exercise program.  Response to previous treatment: felt good  Functional change: walking more and more in brace     Pain: 0/10 with movement 1/10   Location: right ankle    Objective      Ankle Girth: (measured in centimeters)  9-11-23 pt seen in PM  Ankle RLE   Mid malleoli 26.2   Mid foot 21.5   MT heads 22.0   Mid calf  33.5      Ankle Girth: (measured in centimeters) initial eval seen in AM  Ankle LLE RLE   Mid malleoli 24.6 26.5   Mid foot 21.8 22.5   MT heads 22.0 22.0   Mid calf   32.5 32.8     Ankle ROM: (measured in degrees) 9-11-23  Ankle RLE   Dorsiflexion with knees straight 5   Dorsiflexion with knees bent 10   Plantarflexion 40   Inversion 30   Eversion 20   Great toe flexion 35   Great toe extension 65       Ankle ROM: (measured in degrees)   Ankle LLE RLE   Dorsiflexion with knees straight 10 3   Dorsiflexion with knees bent 15 5   Plantarflexion 45 35   Inversion 30 20   Eversion 20 10   Great toe flexion 45 25   Great toe extension 70 60      Knee  ROM: (measured in degrees)   Knee (L) (R)   Flexion 150 145   Extension 5 5       Treatment     Kristen received the treatments listed below:      therapeutic exercises to develop strength, endurance, ROM, and flexibility for 25 minutes including:  Emphasis on pain free zone   (NP)Ankle DF/PF over 1/2 foam roll x 1 min  (NP)Ankle circles x 10 CW and CCW   GSS with strap 10   Soleus stretch x 10 with strap  Toe crunches 2 min, reinforced full flexion    Towel inversion 1 min  Towel eversion 1 min  Toe splay x 15  (NP)Toe flexion/extension over edge of foam for full AROM x 1 min  Ankle 4 way red  thera-band x 10 ea doing well able to progress to green for home and pt reports having TB at home provided HEP  Standing heel raises x 15    manual therapy techniques: Joint Mobilizations, Soft tissue Mobilization and effleurage were applied to the: Right ankle for 10 minutes, including:  Gentle passive ROM with STM as needed  Gentle effleurage for swelling management  MTP joint mob emphasis on great toe     neuromuscular re-education activities to improve: Balance, Coordination, Proprioception, and Motor Control for 00 minutes. The following activities were included:  May add at later session    therapeutic activities to improve functional performance for 15 minutes, including: discussed shoe wear and pt is wearing flat sandals with no support Pt reports she has some Birkenstock sandals.  Pt reports using athletic shoe.  Pt states her  "daughter works at [employer] office and is to check with MD if able to wean out of brace yet    Standing weight shifts to tolerance for weight bearing; x 10 lateral, x 10 each diagonal (brace off)  March on Air ex x 1 min  (NP)1/2 kneeling closed chain ankle dorsiflexion x 10 with 5s hold for gentle ROM within tolerance  Single limb stance right lower extremity Airex 15"H x 3  Stat  bike lv 3 x 5 min brace on  Pt asked about riding her bike  Instructed pt of concerns of pt possibly falling and re injuring ankle    Instructed pt to ice and elevate when she goes home after PT.  Pt reported going straight home.  Instructed pt that we would ice here if pt not going home right away        Patient Education and Home Exercises       Education provided:   - stretching to point of tightness not pain  - comfortable range of motion with all exercises  - importance of compliance to HEP    Written Home Exercises Provided: yes. Exercises were reviewed and Kristen was able to demonstrate them prior to the end of the session.  Kristen demonstrated good  understanding of the education provided. See Electronic Medical Record under Patient Instructions for exercises provided during therapy sessions    Assessment     Patient continues with swelling throughout right ankle but is improved from IE when measured in AM and pt being seen in PM.  Pt tolerated progression with CKC out of brace and will check with daughter on MD response to pt weaning out of brace and if follow up is needed.  Will message MD office as needed for this information Pt tolerated treatment well and able to progress with strengthening and stabilization without adverse effect   Pt appears to understand precautions with returning to riding bike     Kristen Is progressing well towards her goals.   Patient prognosis is Good.     Patient will continue to benefit from skilled outpatient physical therapy to address the deficits listed in the problem list box on initial evaluation, " provide pt/family education and to maximize pt's level of independence in the home and community environment.     Patient's spiritual, cultural and educational needs considered and pt agreeable to plan of care and goals.     Anticipated barriers to physical therapy: none    Goals:   Short Term Goals:  4 weeks (Progressing, not met)  Increase range of motion 25%  Increase strength 1/2 muscle grade  Be able to perform HEP with minimal cueing required  Decrease swelling of mid malleoli girth 0.5 cm     Long Term Goals: 8 weeks (Progressing, not met)  Increase range of motion to 75% to 100% full   Improve muscle strength 1 muscle grade  Improve muscle strength with MMT to 4+/5 to 5/5  Decrease swelling to min-0 level  Restore ability to ambulate with normal pain free gait without brace   Walking for ADL, work  and exercise will be restored without increased pain  Restore ability to participate in an exercise program for Wellness   Restore ability to stand for ADL and work without increased pain  Restore ability to climb stairs in a reciprocal manner with min to 0 increased pain and/or difficulty  Restore ability to perform ADL's and household activities independently and without increased pain    Plan     Continue with current POC toward established physical therapy goals.    Kinjal Amos, PT

## 2023-09-11 ENCOUNTER — CLINICAL SUPPORT (OUTPATIENT)
Dept: REHABILITATION | Facility: HOSPITAL | Age: 58
End: 2023-09-11
Payer: COMMERCIAL

## 2023-09-11 DIAGNOSIS — S93.421S SPRAIN OF DELTOID LIGAMENT OF RIGHT ANKLE, SEQUELA: ICD-10-CM

## 2023-09-11 DIAGNOSIS — M25.571 ACUTE RIGHT ANKLE PAIN: Primary | ICD-10-CM

## 2023-09-11 DIAGNOSIS — S93.491D SPRAIN OF ANTERIOR TALOFIBULAR LIGAMENT OF RIGHT ANKLE, SUBSEQUENT ENCOUNTER: ICD-10-CM

## 2023-09-11 DIAGNOSIS — M62.81 MUSCLE WEAKNESS: ICD-10-CM

## 2023-09-11 DIAGNOSIS — M25.671 DECREASED RANGE OF MOTION OF RIGHT ANKLE: ICD-10-CM

## 2023-09-11 PROCEDURE — 97140 MANUAL THERAPY 1/> REGIONS: CPT | Mod: PN | Performed by: PHYSICAL THERAPIST

## 2023-09-11 PROCEDURE — 97110 THERAPEUTIC EXERCISES: CPT | Mod: PN | Performed by: PHYSICAL THERAPIST

## 2023-09-11 PROCEDURE — 97530 THERAPEUTIC ACTIVITIES: CPT | Mod: PN | Performed by: PHYSICAL THERAPIST

## 2023-09-12 DIAGNOSIS — M25.571 PAIN IN JOINT INVOLVING RIGHT ANKLE AND FOOT: Primary | ICD-10-CM

## 2023-09-13 ENCOUNTER — CLINICAL SUPPORT (OUTPATIENT)
Dept: REHABILITATION | Facility: HOSPITAL | Age: 58
End: 2023-09-13
Payer: COMMERCIAL

## 2023-09-13 DIAGNOSIS — M25.671 DECREASED RANGE OF MOTION OF RIGHT ANKLE: ICD-10-CM

## 2023-09-13 DIAGNOSIS — M62.81 MUSCLE WEAKNESS: ICD-10-CM

## 2023-09-13 DIAGNOSIS — S93.421S SPRAIN OF DELTOID LIGAMENT OF RIGHT ANKLE, SEQUELA: ICD-10-CM

## 2023-09-13 DIAGNOSIS — M25.571 ACUTE RIGHT ANKLE PAIN: Primary | ICD-10-CM

## 2023-09-13 DIAGNOSIS — S93.491D SPRAIN OF ANTERIOR TALOFIBULAR LIGAMENT OF RIGHT ANKLE, SUBSEQUENT ENCOUNTER: ICD-10-CM

## 2023-09-13 PROCEDURE — 97110 THERAPEUTIC EXERCISES: CPT | Mod: PN,CQ

## 2023-09-13 PROCEDURE — 97530 THERAPEUTIC ACTIVITIES: CPT | Mod: PN,CQ

## 2023-09-13 PROCEDURE — 97140 MANUAL THERAPY 1/> REGIONS: CPT | Mod: PN,CQ

## 2023-09-13 NOTE — PROGRESS NOTES
[facility]   Physical Therapy Treatment Note      Name: Kristen Welch  Clinic Number: [id number]    Therapy Diagnosis:   Encounter Diagnoses   Name Primary?    Acute right ankle pain Yes    Sprain of anterior talofibular ligament of right ankle, subsequent encounter     Sprain of deltoid ligament of right ankle, sequela     Decreased range of motion of right ankle     Muscle weakness        Physician: Tj Servin MD    Visit Date: 9/13/2023    Physician Orders: PT Eval and Treat 2-3 times per week for 6 to 8 weeks per low ankle sprain protocol.  Atfl AND deltoid ligament.  Medical Diagnosis from Referral:   S93.491A (ICD-10-CM) - Sprain of anterior talofibular ligament of right ankle, initial encounter   S93.421A (ICD-10-CM) - Sprain of deltoid ligament of right ankle, initial encounter      Evaluation Date: 8/25/2023  Authorization Period Expiration: 8-23-24  Plan of Care Expiration: 10-20-23  Progress Note Due: 9-25-23  Visit # / Visits authorized: 4/20 +eval  FOTO: Issued Visit #: 1     PTA Visit #: 1/5     MD Follow up appointment: none scheduled     Precautions: Standard and , low ankle sprain precautions for deltoid ligament and ATFL      Time In: 3:10  Time Out: 4:00  Total Billable Time: 50 minutes      Subjective     Patient reports: foot is feeling better, more strength, mobility and stability, but because of the swelling her doctor said he still wants the brace on and to maybe get an MRI.    She was compliant with home exercise program.  Response to previous treatment: felt good  Functional change: walking more and more in brace     Pain: 0/10 with movement 1/10   Location: right ankle    Objective      No objective measures taken this date.     Treatment     Kristen received the treatments listed below:      therapeutic exercises to develop strength, endurance, ROM, and flexibility for 20 minutes including:  Emphasis on pain free zone   (NP)Ankle DF/PF over 1/2 foam  "roll x 1 min  (NP)Ankle circles x 10 CW and CCW   GSS with strap 10  Soleus stretch x 10 with strap  Toe crunches 2 min, reinforced full flexion    Towel inversion/eversion slides, heel on blue oval foam x 3 rounds each  Towel eversion 1 min  Toe splay x 15  (NP)Toe flexion/extension over edge of foam for full AROM x 1 min  Ankle 4 way green thera-band x 10 ea - demonstrated with good technique  Standing heel raises x 15    manual therapy techniques: Joint Mobilizations, Soft tissue Mobilization and effleurage were applied to the: Right ankle for 15 minutes, including:  Gentle passive ROM with STM as needed  Gentle effleurage for swelling management  MTP joint mob emphasis on great toe   Gentle Grade I-II talar glides A and P for improved ankle dorsiflexion and planter flexion  Assessed fibular mobility at proximal and distal heads    Kinesiotaping: 10 inch Y strip along gastroc/achilles for support/edema control, 8 inch I strip for navicular support to reduce midfoot pronation    neuromuscular re-education activities to improve: Balance, Coordination, Proprioception, and Motor Control for 00 minutes. The following activities were included:  May add at later session    therapeutic activities to improve functional performance for 15 minutes, including: discussed shoe wear and pt is wearing flat sandals with no support Pt reports she has some Birkenstock sandals.  Pt reports using athletic shoe.      Standing weight shifts to tolerance for weight bearing; x 10 lateral, x 10 each diagonal (brace off)  March on Air ex x 1 min  (NP) 1/2 kneeling closed chain ankle dorsiflexion x 10 with 5s hold for gentle ROM within tolerance  Single limb stance right lower extremity Airex 15"H x 3  Single leg balance on left with perturbation to land on R LE to simulate if having to put foot down quickly when biking.  Stat  bike lv 3 x 5 min brace on  Pt asked about riding her bike  Instructed pt of concerns of pt possibly falling " and re injuring ankle    Instructed pt to ice and elevate when she goes home after PT.  Pt reported going straight home.  Instructed pt that we would ice here if pt not going home right away        Patient Education and Home Exercises       Education provided:   - stretching to point of tightness not pain  - comfortable range of motion with all exercises  - importance of compliance to HEP    Written Home Exercises Provided: yes. Exercises were reviewed and Kristen was able to demonstrate them prior to the end of the session.  Kristen demonstrated good  understanding of the education provided. See Electronic Medical Record under Patient Instructions for exercises provided during therapy sessions    Assessment     Patient tolerated progression of treatment well this date without flare of symptoms. R ankle does remain more swollen than L ankle however ankle mobility is near equal. Some tightness at end ranges of all planes of AROM but not really painful. Good stabilization techniques with perturbations landing on R foot, no pain reported. She is progressing well with motor control and strength of the right ankle and will benefit from continued progression as able. Will assess tolerance to kinesiotape next session and continue as needed.   Pt appears to understand precautions with returning to riding bike     Kristen Is progressing well towards her goals.   Patient prognosis is Good.     Patient will continue to benefit from skilled outpatient physical therapy to address the deficits listed in the problem list box on initial evaluation, provide pt/family education and to maximize pt's level of independence in the home and community environment.     Patient's spiritual, cultural and educational needs considered and pt agreeable to plan of care and goals.     Anticipated barriers to physical therapy: none    Goals:   Short Term Goals:  4 weeks (Progressing, not met)  Increase range of motion 25%  Increase strength 1/2  muscle grade  Be able to perform HEP with minimal cueing required  Decrease swelling of mid malleoli girth 0.5 cm     Long Term Goals: 8 weeks (Progressing, not met)  Increase range of motion to 75% to 100% full   Improve muscle strength 1 muscle grade  Improve muscle strength with MMT to 4+/5 to 5/5  Decrease swelling to min-0 level  Restore ability to ambulate with normal pain free gait without brace   Walking for ADL, work  and exercise will be restored without increased pain  Restore ability to participate in an exercise program for Wellness   Restore ability to stand for ADL and work without increased pain  Restore ability to climb stairs in a reciprocal manner with min to 0 increased pain and/or difficulty  Restore ability to perform ADL's and household activities independently and without increased pain    Plan     Continue with current POC toward established physical therapy goals.    Enma Coello, PTA

## 2023-09-18 ENCOUNTER — CLINICAL SUPPORT (OUTPATIENT)
Dept: REHABILITATION | Facility: HOSPITAL | Age: 58
End: 2023-09-18
Payer: COMMERCIAL

## 2023-09-18 DIAGNOSIS — M62.81 MUSCLE WEAKNESS: ICD-10-CM

## 2023-09-18 DIAGNOSIS — M25.571 ACUTE RIGHT ANKLE PAIN: Primary | ICD-10-CM

## 2023-09-18 DIAGNOSIS — M25.671 DECREASED RANGE OF MOTION OF RIGHT ANKLE: ICD-10-CM

## 2023-09-18 DIAGNOSIS — S93.421S SPRAIN OF DELTOID LIGAMENT OF RIGHT ANKLE, SEQUELA: ICD-10-CM

## 2023-09-18 DIAGNOSIS — S93.491D SPRAIN OF ANTERIOR TALOFIBULAR LIGAMENT OF RIGHT ANKLE, SUBSEQUENT ENCOUNTER: ICD-10-CM

## 2023-09-18 PROCEDURE — 97530 THERAPEUTIC ACTIVITIES: CPT | Mod: PN,CQ

## 2023-09-18 PROCEDURE — 97140 MANUAL THERAPY 1/> REGIONS: CPT | Mod: PN,CQ

## 2023-09-18 PROCEDURE — 97110 THERAPEUTIC EXERCISES: CPT | Mod: PN,CQ

## 2023-09-18 NOTE — PROGRESS NOTES
[facility]   Physical Therapy Treatment Note      Name: Kristen Welch  Clinic Number: [id number]    Therapy Diagnosis:   Encounter Diagnoses   Name Primary?    Acute right ankle pain Yes    Sprain of anterior talofibular ligament of right ankle, subsequent encounter     Sprain of deltoid ligament of right ankle, sequela     Decreased range of motion of right ankle     Muscle weakness          Physician: Tj Servin MD    Visit Date: 9/18/2023    Physician Orders: PT Eval and Treat 2-3 times per week for 6 to 8 weeks per low ankle sprain protocol.  Atfl AND deltoid ligament.  Medical Diagnosis from Referral:   S93.491A (ICD-10-CM) - Sprain of anterior talofibular ligament of right ankle, initial encounter   S93.421A (ICD-10-CM) - Sprain of deltoid ligament of right ankle, initial encounter      Evaluation Date: 8/25/2023  Authorization Period Expiration: 8-23-24  Plan of Care Expiration: 10-20-23  Progress Note Due: 9-25-23  Visit # / Visits authorized: 5/20 +eval  FOTO: Issued Visit #: 1     PTA Visit #: 2/5     MD Follow up appointment: none scheduled     Precautions: Standard and , low ankle sprain precautions for deltoid ligament and ATFL      Time In: 2:33  Time Out: 3:15  Total Billable Time: 43 minutes      Subjective     Patient reports: swelling had been a lot better, but feels slightly more swollen today. Does feel like she is having more mobility and less pain but swelling still fluctuates.    She was compliant with home exercise program.  Response to previous treatment: felt good  Functional change: walking more and more in brace     Pain: 0/10 with movement 1/10   Location: right ankle    Objective      No objective measures taken this date.     Treatment     Kristen received the treatments listed below:      therapeutic exercises to develop strength, endurance, ROM, and flexibility for 20 minutes including:  Emphasis on pain free zone   AROM ankle PF/DF with toe  "flexion and extension x 10  (NP)Ankle circles x 10 CW and CCW   Ankle 4 way green thera-band x 10 ea - demonstrated with good technique  GSS with strap 10  Soleus stretch x 10 with strap  Toe crunches 1 min, reinforced full flexion    Towel inversion/eversion slides, heel on blue oval foam x 3 rounds each  (NP) Towel eversion 1 min  Toe splay x 15   Arch doming 10 x 5s seated   Standing arch doming 10 x 5s  Standing heel raises x 15 with doming    manual therapy techniques: Joint Mobilizations, Soft tissue Mobilization and effleurage were applied to the: Right ankle for 13 minutes, including:  Gentle passive ROM with STM as needed  Gentle effleurage for swelling management (minimal)  MTP joint mob emphasis on great toe   Gentle Grade I-II talar glides A and P for improved ankle dorsiflexion and planter flexion  Assessed fibular mobility at proximal and distal heads    Kinesiotaping: 10 inch Y strip along gastroc/achilles for support/edema control, 8 inch I strip for navicular support to reduce midfoot pronation    neuromuscular re-education activities to improve: Balance, Coordination, Proprioception, and Motor Control for 00 minutes. The following activities were included:  May add at later session    therapeutic activities to improve functional performance for 10 minutes, including: discussed shoe wear and pt is wearing flat sandals with no support Pt reports she has some Birkenstock sandals.  Pt reports using athletic shoe.      Standing weight shifts to tolerance for weight bearing; x 10 lateral, x 10 each diagonal (brace off)  March on Air ex x 1 min  (NP) 1/2 kneeling closed chain ankle dorsiflexion x 10 with 5s hold for gentle ROM within tolerance  Single limb stance right lower extremity Airex 15"H x 3  Single leg balance on left with perturbation to land on R LE to simulate if having to put foot down quickly when biking.  Instructed patient on safety to return to biking outdoors, beginning in less " trafficked areas for around 10 minutes, progressing in 5 minute increments as allowed based on residual and next day soreness.  (NP) Stat  bike lv 3 x 5 min brace on  Pt asked about riding her bike  Instructed pt of concerns of pt possibly falling and re injuring ankle    Instructed pt to ice and elevate when she goes home after PT.  Pt reported going straight home.  Instructed pt that we would ice here if pt not going home right away        Patient Education and Home Exercises       Education provided:   - stretching to point of tightness not pain  - comfortable range of motion with all exercises  - importance of compliance to HEP    Written Home Exercises Provided: yes. Exercises were reviewed and Kristen was able to demonstrate them prior to the end of the session.  Kristen demonstrated good  understanding of the education provided. See Electronic Medical Record under Patient Instructions for exercises provided during therapy sessions    Assessment     Patient presents to clinic with mild swelling to the right ankle that reduced some with therapy exercises. She continues with mild talar stiffness which improves with manual techniques and exercises. Improving stability and strength overall with closed chain activities and able to introduce foot intrinsic strength and stabilization as well. Mild muscle fatigue by end of session but no return of pain or discomfort. She is progressing well with motor control and strength of the right ankle and will benefit from continued progression as able. Continue kinesiotape as needed.    Kristen Is progressing well towards her goals.   Patient prognosis is Good.     Patient will continue to benefit from skilled outpatient physical therapy to address the deficits listed in the problem list box on initial evaluation, provide pt/family education and to maximize pt's level of independence in the home and community environment.     Patient's spiritual, cultural and educational needs  considered and pt agreeable to plan of care and goals.     Anticipated barriers to physical therapy: none    Goals:   Short Term Goals:  4 weeks (Progressing, not met)  Increase range of motion 25%  Increase strength 1/2 muscle grade  Be able to perform HEP with minimal cueing required  Decrease swelling of mid malleoli girth 0.5 cm     Long Term Goals: 8 weeks (Progressing, not met)  Increase range of motion to 75% to 100% full   Improve muscle strength 1 muscle grade  Improve muscle strength with MMT to 4+/5 to 5/5  Decrease swelling to min-0 level  Restore ability to ambulate with normal pain free gait without brace   Walking for ADL, work  and exercise will be restored without increased pain  Restore ability to participate in an exercise program for Wellness   Restore ability to stand for ADL and work without increased pain  Restore ability to climb stairs in a reciprocal manner with min to 0 increased pain and/or difficulty  Restore ability to perform ADL's and household activities independently and without increased pain    Plan     Continue with current POC toward established physical therapy goals.    Enma Coello, PTA

## 2023-09-21 ENCOUNTER — CLINICAL SUPPORT (OUTPATIENT)
Dept: REHABILITATION | Facility: HOSPITAL | Age: 58
End: 2023-09-21
Payer: COMMERCIAL

## 2023-09-21 DIAGNOSIS — M62.81 MUSCLE WEAKNESS: ICD-10-CM

## 2023-09-21 DIAGNOSIS — S93.491D SPRAIN OF ANTERIOR TALOFIBULAR LIGAMENT OF RIGHT ANKLE, SUBSEQUENT ENCOUNTER: ICD-10-CM

## 2023-09-21 DIAGNOSIS — S93.421S SPRAIN OF DELTOID LIGAMENT OF RIGHT ANKLE, SEQUELA: ICD-10-CM

## 2023-09-21 DIAGNOSIS — M25.571 ACUTE RIGHT ANKLE PAIN: Primary | ICD-10-CM

## 2023-09-21 DIAGNOSIS — M25.671 DECREASED RANGE OF MOTION OF RIGHT ANKLE: ICD-10-CM

## 2023-09-21 PROCEDURE — 97530 THERAPEUTIC ACTIVITIES: CPT | Mod: PN | Performed by: PHYSICAL THERAPIST

## 2023-09-21 PROCEDURE — 97140 MANUAL THERAPY 1/> REGIONS: CPT | Mod: PN | Performed by: PHYSICAL THERAPIST

## 2023-09-21 PROCEDURE — 97110 THERAPEUTIC EXERCISES: CPT | Mod: PN | Performed by: PHYSICAL THERAPIST

## 2023-09-22 NOTE — PROGRESS NOTES
[facility]   Physical Therapy Progress and Treatment Note      Name: Kristen Welch  [facility] Number: [id number]    Therapy Diagnosis:   Encounter Diagnoses   Name Primary?    Acute right ankle pain Yes    Sprain of anterior talofibular ligament of right ankle, subsequent encounter     Sprain of deltoid ligament of right ankle, sequela     Decreased range of motion of right ankle     Muscle weakness          Physician: Tj Servin MD    Visit Date: 9/25/2023    Physician Orders: PT Eval and Treat 2-3 times per week for 6 to 8 weeks per low ankle sprain protocol.  Atfl AND deltoid ligament.  Medical Diagnosis from Referral:   S93.491A (ICD-10-CM) - Sprain of anterior talofibular ligament of right ankle, initial encounter   S93.421A (ICD-10-CM) - Sprain of deltoid ligament of right ankle, initial encounter      Evaluation Date: 8/25/2023  Authorization Period Expiration: 8-23-24  Plan of Care Expiration: 10-20-23  Progress Note Due: 10-20-23  Visit # / Visits authorized: 7/20 +eval  FOTO: Issued Visit #: 1     PTA Visit #: 2/5     MD Follow up appointment: none scheduled     Precautions: Standard and , low ankle sprain precautions for deltoid ligament and ATFL      Time In: 9:16  Time Out: 10:00  Total Billable Time: 43 minutes      Subjective     Patient reports: she rode bike instead and did well 30 min of walking and did ice Felt a little more Pt states she iced extra and felt fine   She was compliant with home exercise program.  Response to previous treatment: felt good  Functional change: walking without brace and able to walk 1.5 miles     Pain: 0/10 at rest with movement 1/10   Location: right ankle    Objective      Ankle Girth: (measured in centimeters)  9-25-23 pt seen in AM  Ankle RLE   Mid malleoli 25.7   Mid foot 21.5   MT heads 22.0   Mid calf  33.5      Ankle Girth: (measured in centimeters) initial eval seen in AM  Ankle LLE RLE   Mid malleoli 24.6 26.5   Mid foot 21.8  22.5   MT heads 22.0 22.0   Mid calf  32.5 32.8      Ankle ROM: (measured in degrees) 9-25-23  Ankle RLE   Dorsiflexion with knees straight 8   Dorsiflexion with knees bent 15   Plantarflexion 45   Inversion 30   Eversion 20   Great toe flexion 45   Great toe extension 65         Ankle ROM: (measured in degrees) initial eval   Ankle LLE RLE   Dorsiflexion with knees straight 10 3   Dorsiflexion with knees bent 15 5   Plantarflexion 45 35   Inversion 30 20   Eversion 20 10   Great toe flexion 45 25   Great toe extension 70 60     Functional assessment:   - walking/gait:ambulating with slight decrease in push off and heel strike with c/o pain with push off with Swiss ankle brace  - sit to stand: no deficit  - sit to supine: no deficit       - supine to sit: no deficit  - supine to prone: no deficit   Flexibility testing:  - hamstrings:     90/90 test R 10 L 10        Muscle Strength 9-25-23  MMT R   Toe flexors  4+/5   Knee flexion 5/5   Ankle dorsiflexion 5/5   Ankle plantar flexion 4+/5   Ankle inversion + PF  5/5   Ankle eversion 4/5               Muscle Strength  MMT L R   Hip flexion 5/5 5/5   Hip abduction 5/5 5/5   Hip extension 5/5 5/5   Toe flexors 5/5 N/T   Toe extensors 5/5 N/T   Knee extension 5/5 5/5   Knee flexion 5/5 5/5   Ankle dorsiflexion 5/5 N/T   Ankle plantar flexion 5/5 N/T   Ankle inversion 5/5 N/T   Ankle eversion 5/5 N/T      Endurance is fair      Special tests: bruising noted in distal 1/3 forefoot and lateral toes      Palpation: slight TTP and feels tight medial malleoli at IE warm to touch and c/o TTP around ankle      Joint mobility: forefoot and toes min-mod decreased mobility noted         Intake Outcome Measure for FOTO ankle Survey     Therapist reviewed FOTO scores for Kristen Dowlingramírez   FOTO documents entered into [employer] - see Media section or FOTO account episode details     Intake Score: 77% at IE  49%       Treatment     Kristen received the treatments listed below:   "    therapeutic exercises to develop strength, endurance, ROM, and flexibility for 23 minutes including:  Emphasis on pain free zone   Reassessment   Verbal review of strengthening ex    Ankle 4 way blue thera-band x 10 ea - demonstrated with good technique  GSS with standing x  10  Soleus stretch x 10 standing  Instructed pt to bring in tennis shoes for next session    Held due to time constraints  Toe crunches 1 min, reinforced full flexion    Towel inversion/eversion slides, heel on blue oval foam x 3 rounds each  (NP) Towel eversion 1 min  Toe splay x 15   Arch doming 10 x 5s seated   Standing arch doming 10 x 5s  Standing heel raises x 15 weight shift to R     manual therapy techniques: Joint Mobilizations, Soft tissue Mobilization and effleurage were applied to the: Right ankle for 10 minutes, including:  Good mobility noted with toes and forefoot   Gentle Grade I-II talar glides A and P for improved ankle dorsiflexion and planter flexion  Assessed fibular mobility at proximal and distal heads    MWM to subtalar in standing x 10 no pain    (NP)Kinesiotaping: 10 inch Y strip along gastroc/achilles for support/edema control, 8 inch I strip for navicular support to reduce midfoot pronation    neuromuscular re-education activities to improve: Balance, Coordination, Proprioception, and Motor Control for 00 minutes. The following activities were included:  May add at later session    therapeutic activities to improve functional performance for 10 minutes, including:pronation support she can use Pt with regular tennis shoes but no support for pronation but no pain  Reviewed pacing with pt for exercise to avoid delayed increased pain and swelling   Heel raises x 20 with emphasis on eccentrics   March on Air ex x 2 min   Stairs x 5 after MWM improved subtalar after MWM on stairs    Squats x 5 to maintain mobility watch for knee pain     Single limb stance right lower extremity Airex 15"H x 3 less fatigue at " end  (NP)Single leg balance on left with perturbation to land on R LE to simulate if having to put foot down quickly when biking.    [employer] board level 1 x 10 PF/DF, IN/EV, balance 1 min       Instructed pt to ice and elevate when she goes home after PT.    Patient Education and Home Exercises       Education provided:   - stretching to point of tightness not pain  - comfortable range of motion with all exercises  - importance of compliance to HEP    Written Home Exercises Provided: continue prior HEP Exercises were reviewed and Kristen was able to demonstrate them prior to the end of the session.  Kristen demonstrated good  understanding of the education provided. See Electronic Medical Record under Patient Instructions for exercises provided during therapy sessions    Assessment   Pt continues with min-mod swelling mid malleoli.  Progressing well with exercises for Wellness and able to wean out of brace fully.  Patient demonstrates improvement in range of motion, strength, stabilization and function.    Pt will benefit from continued PT to assist in fully achieving her goals.      Kristen Is progressing well towards her goals.   Patient prognosis is Good.     Patient will continue to benefit from skilled outpatient physical therapy to address the deficits listed in the problem list box on initial evaluation, provide pt/family education and to maximize pt's level of independence in the home and community environment.     Patient's spiritual, cultural and educational needs considered and pt agreeable to plan of care and goals.     Anticipated barriers to physical therapy: none    Goals:   Short Term Goals:  4 weeks MET STG's  Increase range of motion 25%  Increase strength 1/2 muscle grade  Be able to perform HEP with minimal cueing required  Decrease swelling of mid malleoli girth 0.5 cm     Long Term Goals: 8 weeks (Progressing, not met)  Increase range of motion to 75% to 100% full   Improve muscle strength 1  muscle grade  Improve muscle strength with MMT to 4+/5 to 5/5  Decrease swelling to min-0 level  Restore ability to ambulate with normal pain free gait without brace   Walking for ADL, work  and exercise will be restored without increased pain  Restore ability to participate in an exercise program for Wellness   Restore ability to stand for ADL and work without increased pain  Restore ability to climb stairs in a reciprocal manner with min to 0 increased pain and/or difficulty  Restore ability to perform ADL's and household activities independently and without increased pain    Plan     Continue with current POC toward established physical therapy goals.    Kinjal Amos, PT

## 2023-09-25 ENCOUNTER — CLINICAL SUPPORT (OUTPATIENT)
Dept: REHABILITATION | Facility: HOSPITAL | Age: 58
End: 2023-09-25
Payer: COMMERCIAL

## 2023-09-25 DIAGNOSIS — S93.421S SPRAIN OF DELTOID LIGAMENT OF RIGHT ANKLE, SEQUELA: ICD-10-CM

## 2023-09-25 DIAGNOSIS — M25.671 DECREASED RANGE OF MOTION OF RIGHT ANKLE: ICD-10-CM

## 2023-09-25 DIAGNOSIS — M25.571 ACUTE RIGHT ANKLE PAIN: Primary | ICD-10-CM

## 2023-09-25 DIAGNOSIS — M62.81 MUSCLE WEAKNESS: ICD-10-CM

## 2023-09-25 DIAGNOSIS — S93.491D SPRAIN OF ANTERIOR TALOFIBULAR LIGAMENT OF RIGHT ANKLE, SUBSEQUENT ENCOUNTER: ICD-10-CM

## 2023-09-25 PROCEDURE — 97110 THERAPEUTIC EXERCISES: CPT | Mod: PN | Performed by: PHYSICAL THERAPIST

## 2023-09-25 PROCEDURE — 97140 MANUAL THERAPY 1/> REGIONS: CPT | Mod: PN | Performed by: PHYSICAL THERAPIST

## 2023-09-25 PROCEDURE — 97530 THERAPEUTIC ACTIVITIES: CPT | Mod: PN | Performed by: PHYSICAL THERAPIST

## 2023-09-25 NOTE — PLAN OF CARE
[facility]   Physical Therapy Progress and Treatment Note      Name: Kristen Welch  [facility] Number: [id number]    Therapy Diagnosis:   Encounter Diagnoses   Name Primary?    Acute right ankle pain Yes    Sprain of anterior talofibular ligament of right ankle, subsequent encounter     Sprain of deltoid ligament of right ankle, sequela     Decreased range of motion of right ankle     Muscle weakness          Physician: Tj Servin MD    Visit Date: 9/25/2023    Physician Orders: PT Eval and Treat 2-3 times per week for 6 to 8 weeks per low ankle sprain protocol.  Atfl AND deltoid ligament.  Medical Diagnosis from Referral:   S93.491A (ICD-10-CM) - Sprain of anterior talofibular ligament of right ankle, initial encounter   S93.421A (ICD-10-CM) - Sprain of deltoid ligament of right ankle, initial encounter      Evaluation Date: 8/25/2023  Authorization Period Expiration: 8-23-24  Plan of Care Expiration: 10-20-23  Progress Note Due: 10-20-23  Visit # / Visits authorized: 7/20 +eval  FOTO: Issued Visit #: 1     PTA Visit #: 2/5     MD Follow up appointment: none scheduled     Precautions: Standard and , low ankle sprain precautions for deltoid ligament and ATFL      Time In: 9:16  Time Out: 10:00  Total Billable Time: 43 minutes      Subjective     Patient reports: she rode bike instead and did well 30 min of walking and did ice Felt a little more Pt states she iced extra and felt fine   She was compliant with home exercise program.  Response to previous treatment: felt good  Functional change: walking without brace and able to walk 1.5 miles     Pain: 0/10 at rest with movement 1/10   Location: right ankle    Objective      Ankle Girth: (measured in centimeters)  9-25-23 pt seen in AM  Ankle RLE   Mid malleoli 25.7   Mid foot 21.5   MT heads 22.0   Mid calf  33.5      Ankle Girth: (measured in centimeters) initial eval seen in AM  Ankle LLE RLE   Mid malleoli 24.6 26.5   Mid foot 21.8  22.5   MT heads 22.0 22.0   Mid calf  32.5 32.8      Ankle ROM: (measured in degrees) 9-25-23  Ankle RLE   Dorsiflexion with knees straight 8   Dorsiflexion with knees bent 15   Plantarflexion 45   Inversion 30   Eversion 20   Great toe flexion 45   Great toe extension 65         Ankle ROM: (measured in degrees) initial eval   Ankle LLE RLE   Dorsiflexion with knees straight 10 3   Dorsiflexion with knees bent 15 5   Plantarflexion 45 35   Inversion 30 20   Eversion 20 10   Great toe flexion 45 25   Great toe extension 70 60     Functional assessment:   - walking/gait:ambulating with slight decrease in push off and heel strike with c/o pain with push off with Swiss ankle brace  - sit to stand: no deficit  - sit to supine: no deficit       - supine to sit: no deficit  - supine to prone: no deficit   Flexibility testing:  - hamstrings:     90/90 test R 10 L 10        Muscle Strength 9-25-23  MMT R   Toe flexors  4+/5   Knee flexion 5/5   Ankle dorsiflexion 5/5   Ankle plantar flexion 4+/5   Ankle inversion + PF  5/5   Ankle eversion 4/5               Muscle Strength  MMT L R   Hip flexion 5/5 5/5   Hip abduction 5/5 5/5   Hip extension 5/5 5/5   Toe flexors 5/5 N/T   Toe extensors 5/5 N/T   Knee extension 5/5 5/5   Knee flexion 5/5 5/5   Ankle dorsiflexion 5/5 N/T   Ankle plantar flexion 5/5 N/T   Ankle inversion 5/5 N/T   Ankle eversion 5/5 N/T      Endurance is fair      Special tests: bruising noted in distal 1/3 forefoot and lateral toes      Palpation: slight TTP and feels tight medial malleoli at IE warm to touch and c/o TTP around ankle      Joint mobility: forefoot and toes min-mod decreased mobility noted         Intake Outcome Measure for FOTO ankle Survey     Therapist reviewed FOTO scores for Kristen Dowlingramírez   FOTO documents entered into [employer] - see Media section or FOTO account episode details     Intake Score: 77% at IE  49%       Treatment     Kristen received the treatments listed below:   "    therapeutic exercises to develop strength, endurance, ROM, and flexibility for 23 minutes including:  Emphasis on pain free zone   Reassessment   Verbal review of strengthening ex    Ankle 4 way blue thera-band x 10 ea - demonstrated with good technique  GSS with standing x  10  Soleus stretch x 10 standing  Instructed pt to bring in tennis shoes for next session    Held due to time constraints  Toe crunches 1 min, reinforced full flexion    Towel inversion/eversion slides, heel on blue oval foam x 3 rounds each  (NP) Towel eversion 1 min  Toe splay x 15   Arch doming 10 x 5s seated   Standing arch doming 10 x 5s  Standing heel raises x 15 weight shift to R     manual therapy techniques: Joint Mobilizations, Soft tissue Mobilization and effleurage were applied to the: Right ankle for 10 minutes, including:  Good mobility noted with toes and forefoot   Gentle Grade I-II talar glides A and P for improved ankle dorsiflexion and planter flexion  Assessed fibular mobility at proximal and distal heads    MWM to subtalar in standing x 10 no pain    (NP)Kinesiotaping: 10 inch Y strip along gastroc/achilles for support/edema control, 8 inch I strip for navicular support to reduce midfoot pronation    neuromuscular re-education activities to improve: Balance, Coordination, Proprioception, and Motor Control for 00 minutes. The following activities were included:  May add at later session    therapeutic activities to improve functional performance for 10 minutes, including:pronation support she can use Pt with regular tennis shoes but no support for pronation but no pain  Reviewed pacing with pt for exercise to avoid delayed increased pain and swelling   Heel raises x 20 with emphasis on eccentrics   March on Air ex x 2 min   Stairs x 5 after MWM improved subtalar after MWM on stairs    Squats x 5 to maintain mobility watch for knee pain     Single limb stance right lower extremity Airex 15"H x 3 less fatigue at " end  (NP)Single leg balance on left with perturbation to land on R LE to simulate if having to put foot down quickly when biking.    [employer] board level 1 x 10 PF/DF, IN/EV, balance 1 min       Instructed pt to ice and elevate when she goes home after PT.    Patient Education and Home Exercises       Education provided:   - stretching to point of tightness not pain  - comfortable range of motion with all exercises  - importance of compliance to HEP    Written Home Exercises Provided: continue prior HEP Exercises were reviewed and Kristen was able to demonstrate them prior to the end of the session.  Kristen demonstrated good  understanding of the education provided. See Electronic Medical Record under Patient Instructions for exercises provided during therapy sessions    Assessment   Pt continues with min-mod swelling mid malleoli.  Progressing well with exercises for Wellness and able to wean out of brace fully.  Patient demonstrates improvement in range of motion, strength, stabilization and function.    Pt will benefit from continued PT to assist in fully achieving her goals.      Kristen Is progressing well towards her goals.   Patient prognosis is Good.     Patient will continue to benefit from skilled outpatient physical therapy to address the deficits listed in the problem list box on initial evaluation, provide pt/family education and to maximize pt's level of independence in the home and community environment.     Patient's spiritual, cultural and educational needs considered and pt agreeable to plan of care and goals.     Anticipated barriers to physical therapy: none    Goals:   Short Term Goals:  4 weeks MET STG's  Increase range of motion 25%  Increase strength 1/2 muscle grade  Be able to perform HEP with minimal cueing required  Decrease swelling of mid malleoli girth 0.5 cm     Long Term Goals: 8 weeks (Progressing, not met)  Increase range of motion to 75% to 100% full   Improve muscle strength 1  muscle grade  Improve muscle strength with MMT to 4+/5 to 5/5  Decrease swelling to min-0 level  Restore ability to ambulate with normal pain free gait without brace   Walking for ADL, work  and exercise will be restored without increased pain  Restore ability to participate in an exercise program for Wellness   Restore ability to stand for ADL and work without increased pain  Restore ability to climb stairs in a reciprocal manner with min to 0 increased pain and/or difficulty  Restore ability to perform ADL's and household activities independently and without increased pain    Plan     Continue with current POC toward established physical therapy goals.    Kinjal Amos, PT

## 2023-09-28 ENCOUNTER — CLINICAL SUPPORT (OUTPATIENT)
Dept: REHABILITATION | Facility: HOSPITAL | Age: 58
End: 2023-09-28
Payer: COMMERCIAL

## 2023-09-28 DIAGNOSIS — S93.421S SPRAIN OF DELTOID LIGAMENT OF RIGHT ANKLE, SEQUELA: ICD-10-CM

## 2023-09-28 DIAGNOSIS — M25.571 ACUTE RIGHT ANKLE PAIN: Primary | ICD-10-CM

## 2023-09-28 DIAGNOSIS — M62.81 MUSCLE WEAKNESS: ICD-10-CM

## 2023-09-28 DIAGNOSIS — S93.491D SPRAIN OF ANTERIOR TALOFIBULAR LIGAMENT OF RIGHT ANKLE, SUBSEQUENT ENCOUNTER: ICD-10-CM

## 2023-09-28 DIAGNOSIS — M25.671 DECREASED RANGE OF MOTION OF RIGHT ANKLE: ICD-10-CM

## 2023-09-28 PROCEDURE — 97530 THERAPEUTIC ACTIVITIES: CPT | Mod: PN,CQ

## 2023-09-28 PROCEDURE — 97110 THERAPEUTIC EXERCISES: CPT | Mod: PN,CQ

## 2023-09-28 PROCEDURE — 97140 MANUAL THERAPY 1/> REGIONS: CPT | Mod: PN,CQ

## 2023-09-28 NOTE — PROGRESS NOTES
[facility]   Physical Therapy Progress and Treatment Note      Name: Kristen Welch  Clinic Number: [id number]    Therapy Diagnosis:   Encounter Diagnoses   Name Primary?    Acute right ankle pain Yes    Sprain of anterior talofibular ligament of right ankle, subsequent encounter     Sprain of deltoid ligament of right ankle, sequela     Decreased range of motion of right ankle     Muscle weakness          Physician: Tj Servin MD    Visit Date: 9/28/2023    Physician Orders: PT Eval and Treat 2-3 times per week for 6 to 8 weeks per low ankle sprain protocol.  Atfl AND deltoid ligament.  Medical Diagnosis from Referral:   S93.491A (ICD-10-CM) - Sprain of anterior talofibular ligament of right ankle, initial encounter   S93.421A (ICD-10-CM) - Sprain of deltoid ligament of right ankle, initial encounter      Evaluation Date: 8/25/2023  Authorization Period Expiration: 8-23-24  Plan of Care Expiration: 10-20-23  Progress Note Due: 10-20-23  Visit # / Visits authorized: 7/20 +eval  FOTO: Issued Visit #: 1     PTA Visit #: 2/5     MD Follow up appointment: none scheduled     Precautions: Standard and , low ankle sprain precautions for deltoid ligament and ATFL      Time In: 9:18  Time Out: 10:06  Total Billable Time: 48 minutes      Subjective     Patient reports: ankle is feeling good today, did some stretching this morning. Has been able to bike ride without issue. Still going for walks as well.    She was compliant with home exercise program.  Response to previous treatment: felt good  Functional change: biking up to 30 minutes, walking up to 1.5 miles    Pain: 0/10 at rest with movement 1/10   Location: right ankle    Objective      Objective measures not taken this date    Treatment     Kristen received the treatments listed below:      therapeutic exercises to develop strength, endurance, ROM, and flexibility for 15 minutes including:  Emphasis on pain free zone   Toe crunches 1  "min, reinforced full flexion    Towel inversion/eversion slides, heel on blue oval foam x 3 rounds each  (NP) Towel eversion 1 min  Toe splay x 15  Arch doming 10 x 5s seated  Standing arch doming 10 x 5s  Standing heel raises x 15 weight shift to R     Verbal review of strengthening ex:  Ankle 4 way blue thera-band x 10 ea - demonstrated with good technique  GSS with standing x  10  Soleus stretch x 10 standing  Instructed pt to bring in tennis shoes for next session      manual therapy techniques: Joint Mobilizations, Soft tissue Mobilization and effleurage were applied to the: Right ankle for 08 minutes, including:  Good mobility noted with toes and forefoot   Gentle Grade I-II talar glides A and P for improved ankle dorsiflexion and planter flexion  Assessed fibular mobility at proximal and distal heads    MWM to subtalar in standing x 10 no pain    (NP)Kinesiotaping: 10 inch Y strip along gastroc/achilles for support/edema control, 8 inch I strip for navicular support to reduce midfoot pronation    neuromuscular re-education activities to improve: Balance, Coordination, Proprioception, and Motor Control for 00 minutes. The following activities were included:  May add at later session    therapeutic activities to improve functional performance for 25 minutes, including:pronation support she can use Pt with regular tennis shoes but no support for pronation but no pain     Looked at current [employer] shoes and have neutral footing, educated patient on possible addition for orthotic to improve arch support to maintain neutral foot with walking and physical activities.  Reviewed pacing with pt for exercise to avoid delayed increased pain and swelling   Heel raises x 20 with emphasis on eccentrics   March on Air ex x 2 min   Fwd/lat step up/down on 6" step x 15 each   Single leg RDL heladio 12" box 2 x 10   Split squat 2 x 8 each   Lateral lunge 2 x 5 each  (NP) Stairs x 5 after MWM improved subtalar after MWM on stairs " "  (NP) Squats x 5 to maintain mobility watch for knee pain     Single limb stance right lower extremity Airex 15"H x 3 less fatigue at end  (NP)Single leg balance on left with perturbation to land on R LE to simulate if having to put foot down quickly when biking.    [employer] board level 2, x 10 PF/DF, IN/EV, balance 1 min       Instructed pt to ice and elevate when she goes home after PT.    Patient Education and Home Exercises       Education provided:   - stretching to point of tightness not pain  - comfortable range of motion with all exercises  - importance of compliance to HEP    Written Home Exercises Provided: continue prior HEP Exercises were reviewed and Kristen was able to demonstrate them prior to the end of the session.  Kristen demonstrated good  understanding of the education provided. See Electronic Medical Record under Patient Instructions for exercises provided during therapy sessions    Assessment   Pt tolerated progression of treatment well this date, able to progress to higher level activities with good mechanics and no symptom provocation. Muscle fatigue with split squats and lateral lunges but tolerated well. Still min-mod swelling mid malleoli but does not flare with activities.    Pt will benefit from continued PT to assist in fully achieving her goals.      Kristen Is progressing well towards her goals.   Patient prognosis is Good.     Patient will continue to benefit from skilled outpatient physical therapy to address the deficits listed in the problem list box on initial evaluation, provide pt/family education and to maximize pt's level of independence in the home and community environment.     Patient's spiritual, cultural and educational needs considered and pt agreeable to plan of care and goals.     Anticipated barriers to physical therapy: none    Goals:   Short Term Goals:  4 weeks MET STG's  Increase range of motion 25%  Increase strength 1/2 muscle grade  Be able to perform HEP with " minimal cueing required  Decrease swelling of mid malleoli girth 0.5 cm     Long Term Goals: 8 weeks (Progressing, not met)  Increase range of motion to 75% to 100% full   Improve muscle strength 1 muscle grade  Improve muscle strength with MMT to 4+/5 to 5/5  Decrease swelling to min-0 level  Restore ability to ambulate with normal pain free gait without brace   Walking for ADL, work  and exercise will be restored without increased pain  Restore ability to participate in an exercise program for Wellness   Restore ability to stand for ADL and work without increased pain  Restore ability to climb stairs in a reciprocal manner with min to 0 increased pain and/or difficulty  Restore ability to perform ADL's and household activities independently and without increased pain    Plan     Continue with current POC toward established physical therapy goals.    Enma Coello, PTA

## 2023-10-10 ENCOUNTER — OFFICE VISIT (OUTPATIENT)
Dept: FAMILY MEDICINE | Facility: CLINIC | Age: 58
End: 2023-10-10
Payer: COMMERCIAL

## 2023-10-10 VITALS
WEIGHT: 144.19 LBS | OXYGEN SATURATION: 95 % | BODY MASS INDEX: 21.36 KG/M2 | DIASTOLIC BLOOD PRESSURE: 80 MMHG | SYSTOLIC BLOOD PRESSURE: 112 MMHG | HEART RATE: 70 BPM | HEIGHT: 69 IN

## 2023-10-10 DIAGNOSIS — Z12.31 ENCOUNTER FOR SCREENING MAMMOGRAM FOR MALIGNANT NEOPLASM OF BREAST: ICD-10-CM

## 2023-10-10 DIAGNOSIS — E03.8 OTHER SPECIFIED HYPOTHYROIDISM: ICD-10-CM

## 2023-10-10 DIAGNOSIS — Z13.6 ENCOUNTER FOR SCREENING FOR CARDIOVASCULAR DISORDERS: ICD-10-CM

## 2023-10-10 DIAGNOSIS — Z00.01 ANNUAL VISIT FOR GENERAL ADULT MEDICAL EXAMINATION WITH ABNORMAL FINDINGS: Primary | ICD-10-CM

## 2023-10-10 PROCEDURE — 3008F PR BODY MASS INDEX (BMI) DOCUMENTED: ICD-10-PCS | Mod: CPTII,S$GLB,, | Performed by: FAMILY MEDICINE

## 2023-10-10 PROCEDURE — 90686 FLU VACCINE (QUAD) GREATER THAN OR EQUAL TO 3YO PRESERVATIVE FREE IM: ICD-10-PCS | Mod: S$GLB,,, | Performed by: FAMILY MEDICINE

## 2023-10-10 PROCEDURE — 3074F PR MOST RECENT SYSTOLIC BLOOD PRESSURE < 130 MM HG: ICD-10-PCS | Mod: CPTII,S$GLB,, | Performed by: FAMILY MEDICINE

## 2023-10-10 PROCEDURE — 99999 PR PBB SHADOW E&M-EST. PATIENT-LVL IV: ICD-10-PCS | Mod: PBBFAC,,, | Performed by: FAMILY MEDICINE

## 2023-10-10 PROCEDURE — 90471 IMMUNIZATION ADMIN: CPT | Mod: S$GLB,,, | Performed by: FAMILY MEDICINE

## 2023-10-10 PROCEDURE — 1159F MED LIST DOCD IN RCRD: CPT | Mod: CPTII,S$GLB,, | Performed by: FAMILY MEDICINE

## 2023-10-10 PROCEDURE — 3079F DIAST BP 80-89 MM HG: CPT | Mod: CPTII,S$GLB,, | Performed by: FAMILY MEDICINE

## 2023-10-10 PROCEDURE — 90686 IIV4 VACC NO PRSV 0.5 ML IM: CPT | Mod: S$GLB,,, | Performed by: FAMILY MEDICINE

## 2023-10-10 PROCEDURE — 90471 FLU VACCINE (QUAD) GREATER THAN OR EQUAL TO 3YO PRESERVATIVE FREE IM: ICD-10-PCS | Mod: S$GLB,,, | Performed by: FAMILY MEDICINE

## 2023-10-10 PROCEDURE — 1159F PR MEDICATION LIST DOCUMENTED IN MEDICAL RECORD: ICD-10-PCS | Mod: CPTII,S$GLB,, | Performed by: FAMILY MEDICINE

## 2023-10-10 PROCEDURE — 3074F SYST BP LT 130 MM HG: CPT | Mod: CPTII,S$GLB,, | Performed by: FAMILY MEDICINE

## 2023-10-10 PROCEDURE — 99396 PR PREVENTIVE VISIT,EST,40-64: ICD-10-PCS | Mod: 25,S$GLB,, | Performed by: FAMILY MEDICINE

## 2023-10-10 PROCEDURE — 3008F BODY MASS INDEX DOCD: CPT | Mod: CPTII,S$GLB,, | Performed by: FAMILY MEDICINE

## 2023-10-10 PROCEDURE — 99396 PREV VISIT EST AGE 40-64: CPT | Mod: 25,S$GLB,, | Performed by: FAMILY MEDICINE

## 2023-10-10 PROCEDURE — 99999 PR PBB SHADOW E&M-EST. PATIENT-LVL IV: CPT | Mod: PBBFAC,,, | Performed by: FAMILY MEDICINE

## 2023-10-10 PROCEDURE — 3079F PR MOST RECENT DIASTOLIC BLOOD PRESSURE 80-89 MM HG: ICD-10-PCS | Mod: CPTII,S$GLB,, | Performed by: FAMILY MEDICINE

## 2023-10-10 NOTE — PROGRESS NOTES
Assessment:       1. Annual visit for general adult medical examination with abnormal findings    2. Encounter for screening mammogram for malignant neoplasm of breast    3. Encounter for screening for cardiovascular disorders    4. Other specified hypothyroidism        Plan:       Annual visit for general adult medical examination with abnormal findings  -     Mammo Digital Screening Bilat w/ Kike; Future; Expected date: 10/10/2023  -     CBC Auto Differential; Future; Expected date: 10/10/2023  -     Comprehensive Metabolic Panel; Future; Expected date: 10/10/2023  -     Lipid Panel; Future; Expected date: 10/10/2023  -     TSH; Future; Expected date: 10/10/2023    Encounter for screening mammogram for malignant neoplasm of breast  -     Mammo Digital Screening Bilat w/ Kike; Future; Expected date: 10/10/2023    Encounter for screening for cardiovascular disorders  -     CT Cardiac Scoring; Future; Expected date: 10/10/2023    Other specified hypothyroidism: Stable  -     US Thyroid; Future; Expected date: 10/10/2023         Will send a message when we have the results of the test, mammogram was ordered, CT calcium score, ultrasound on the thyroid.   Patient agreed with assessment and plan. Patient verbalized understanding.     Subjective:       Patient ID: Kristen Welch is a 58 y.o. female.    Chief Complaint: Annual Exam    HPI    The patient is coming here today for an annual examination.  The patient has hypothyroidism, the last TSH levels were check a year ago and was in normal range, currently taking levothyroxine 88 mcg daily, denies any side effects of the medication.  The patient is been exercising, eating healthy, she is maintain her weight, the patient stated that when she was trying to get off of a boat spray her ankle, she is been seen the orthopedic specialist, she is doing better after physical therapy but still having some swelling in the area, she is been elevated and putting ice.    Past  medical history, past social history was reviewed and discussed with the patient.    Review of Systems   Constitutional:  Negative for activity change, appetite change and chills.   HENT:  Negative for congestion and ear discharge.    Eyes:  Negative for discharge and itching.   Respiratory:  Negative for choking and chest tightness.    Cardiovascular:  Negative for chest pain, palpitations and leg swelling.   Gastrointestinal:  Negative for abdominal distention, abdominal pain and constipation.   Endocrine: Negative for cold intolerance and heat intolerance.   Genitourinary:  Negative for dysuria and flank pain.   Musculoskeletal:  Positive for arthralgias and joint swelling. Negative for back pain.   Skin:  Negative for pallor and rash.   Allergic/Immunologic: Negative for environmental allergies and food allergies.   Neurological:  Negative for dizziness, facial asymmetry and headaches.   Hematological:  Negative for adenopathy. Does not bruise/bleed easily.   Psychiatric/Behavioral:  Negative for agitation, confusion, decreased concentration and sleep disturbance.        Objective:      Physical Exam  Vitals and nursing note reviewed.   Constitutional:       General: She is not in acute distress.     Appearance: Normal appearance. She is well-developed. She is not diaphoretic.   HENT:      Head: Normocephalic and atraumatic.      Right Ear: External ear normal.      Left Ear: External ear normal.      Nose: Nose normal.      Mouth/Throat:      Pharynx: No oropharyngeal exudate.   Eyes:      General: No scleral icterus.        Right eye: No discharge.         Left eye: No discharge.      Conjunctiva/sclera: Conjunctivae normal.      Pupils: Pupils are equal, round, and reactive to light.   Cardiovascular:      Rate and Rhythm: Normal rate and regular rhythm.      Heart sounds: Normal heart sounds.   Pulmonary:      Effort: Pulmonary effort is normal. No respiratory distress.      Breath sounds: Normal breath  sounds. No wheezing.   Abdominal:      General: Abdomen is flat. Bowel sounds are normal. There is no distension.      Palpations: There is no mass.      Tenderness: There is no abdominal tenderness. There is no guarding.   Musculoskeletal:         General: Swelling (Right ankle) present. No deformity.      Cervical back: Neck supple.   Skin:     Coloration: Skin is not pale.      Findings: No erythema.   Neurological:      Mental Status: She is alert.      Cranial Nerves: No cranial nerve deficit.      Coordination: Coordination normal.   Psychiatric:         Behavior: Behavior normal.         Thought Content: Thought content normal.         Judgment: Judgment normal.

## 2023-10-11 ENCOUNTER — PATIENT OUTREACH (OUTPATIENT)
Dept: ADMINISTRATIVE | Facility: HOSPITAL | Age: 58
End: 2023-10-11
Payer: COMMERCIAL

## 2023-10-11 ENCOUNTER — LAB VISIT (OUTPATIENT)
Dept: LAB | Facility: HOSPITAL | Age: 58
End: 2023-10-11
Attending: FAMILY MEDICINE
Payer: COMMERCIAL

## 2023-10-11 DIAGNOSIS — Z00.01 ANNUAL VISIT FOR GENERAL ADULT MEDICAL EXAMINATION WITH ABNORMAL FINDINGS: ICD-10-CM

## 2023-10-11 LAB
ALBUMIN SERPL BCP-MCNC: 3.8 G/DL (ref 3.5–5.2)
ALP SERPL-CCNC: 38 U/L (ref 55–135)
ALT SERPL W/O P-5'-P-CCNC: 19 U/L (ref 10–44)
ANION GAP SERPL CALC-SCNC: 9 MMOL/L (ref 8–16)
AST SERPL-CCNC: 16 U/L (ref 10–40)
BASOPHILS # BLD AUTO: 0.05 K/UL (ref 0–0.2)
BASOPHILS NFR BLD: 0.8 % (ref 0–1.9)
BILIRUB SERPL-MCNC: 0.8 MG/DL (ref 0.1–1)
BUN SERPL-MCNC: 13 MG/DL (ref 6–20)
CALCIUM SERPL-MCNC: 8.9 MG/DL (ref 8.7–10.5)
CHLORIDE SERPL-SCNC: 107 MMOL/L (ref 95–110)
CHOLEST SERPL-MCNC: 182 MG/DL (ref 120–199)
CHOLEST/HDLC SERPL: 3 {RATIO} (ref 2–5)
CO2 SERPL-SCNC: 22 MMOL/L (ref 23–29)
CREAT SERPL-MCNC: 0.8 MG/DL (ref 0.5–1.4)
DIFFERENTIAL METHOD: ABNORMAL
EOSINOPHIL # BLD AUTO: 0.5 K/UL (ref 0–0.5)
EOSINOPHIL NFR BLD: 7.2 % (ref 0–8)
ERYTHROCYTE [DISTWIDTH] IN BLOOD BY AUTOMATED COUNT: 13.7 % (ref 11.5–14.5)
EST. GFR  (NO RACE VARIABLE): >60 ML/MIN/1.73 M^2
GLUCOSE SERPL-MCNC: 87 MG/DL (ref 70–110)
HCT VFR BLD AUTO: 39 % (ref 37–48.5)
HDLC SERPL-MCNC: 60 MG/DL (ref 40–75)
HDLC SERPL: 33 % (ref 20–50)
HGB BLD-MCNC: 12.4 G/DL (ref 12–16)
IMM GRANULOCYTES # BLD AUTO: 0.01 K/UL (ref 0–0.04)
IMM GRANULOCYTES NFR BLD AUTO: 0.2 % (ref 0–0.5)
LDLC SERPL CALC-MCNC: 108.4 MG/DL (ref 63–159)
LYMPHOCYTES # BLD AUTO: 1.8 K/UL (ref 1–4.8)
LYMPHOCYTES NFR BLD: 27.4 % (ref 18–48)
MCH RBC QN AUTO: 28 PG (ref 27–31)
MCHC RBC AUTO-ENTMCNC: 31.8 G/DL (ref 32–36)
MCV RBC AUTO: 88 FL (ref 82–98)
MONOCYTES # BLD AUTO: 0.6 K/UL (ref 0.3–1)
MONOCYTES NFR BLD: 9.3 % (ref 4–15)
NEUTROPHILS # BLD AUTO: 3.6 K/UL (ref 1.8–7.7)
NEUTROPHILS NFR BLD: 55.1 % (ref 38–73)
NONHDLC SERPL-MCNC: 122 MG/DL
NRBC BLD-RTO: 0 /100 WBC
PLATELET # BLD AUTO: 259 K/UL (ref 150–450)
PMV BLD AUTO: 11.3 FL (ref 9.2–12.9)
POTASSIUM SERPL-SCNC: 3.7 MMOL/L (ref 3.5–5.1)
PROT SERPL-MCNC: 6.8 G/DL (ref 6–8.4)
RBC # BLD AUTO: 4.43 M/UL (ref 4–5.4)
SODIUM SERPL-SCNC: 138 MMOL/L (ref 136–145)
TRIGL SERPL-MCNC: 68 MG/DL (ref 30–150)
TSH SERPL DL<=0.005 MIU/L-ACNC: 1.57 UIU/ML (ref 0.4–4)
WBC # BLD AUTO: 6.56 K/UL (ref 3.9–12.7)

## 2023-10-11 PROCEDURE — 85025 COMPLETE CBC W/AUTO DIFF WBC: CPT | Performed by: FAMILY MEDICINE

## 2023-10-11 PROCEDURE — 84443 ASSAY THYROID STIM HORMONE: CPT | Performed by: FAMILY MEDICINE

## 2023-10-11 PROCEDURE — 36415 COLL VENOUS BLD VENIPUNCTURE: CPT | Mod: PO | Performed by: FAMILY MEDICINE

## 2023-10-11 PROCEDURE — 80053 COMPREHEN METABOLIC PANEL: CPT | Performed by: FAMILY MEDICINE

## 2023-10-11 PROCEDURE — 80061 LIPID PANEL: CPT | Performed by: FAMILY MEDICINE

## 2023-10-11 NOTE — PROGRESS NOTES
Population Health Chart Review & Patient Outreach Details:     Reason for Outreach Encounter:     [x]  Non-Compliant Report   []  Payor Report ([employer], PHN, BCBS, MSSP, MCIP, UHC, etc.)   []  Pre-Visit Chart Review     Updates Requested / Reviewed:     []  Care Everywhere    []     []  External Sources ([facility], [facility], DIS, etc.)   []  Care Team Updated    Patient Outreach Method:    []  Telephone Outreach Completed   [] Successful   [] Left Voicemail   [] Unable to Contact (wrong number, no voicemail)  []  [employer] Portal Outreach Sent  []  Letter Outreach Mailed  []  Fax Sent for External Records  [x]  External Records Upload    Health Maintenance Topics Addressed and Outreach Outcomes / Actions Taken:        []      Breast Cancer Screening []  Mammo Scheduled      []  External Records Requested     []  Added Reminder to Complete to Upcoming Primary Care Appt Notes     []  Patient Declined     []  Patient Will Call Back to Schedule     []  Patient Will Schedule with External Provider / Order Routed if Applicable             [x]       Cervical Cancer Screening []  Pap Scheduled      []  External Records Requested     []  Added Reminder to Complete to Upcoming Primary Care Appt Notes     []  Patient Declined     []  Patient Will Call Back to Schedule     []  Patient Will Schedule with External Provider               []          Colorectal Cancer Screening []  Colonoscopy Case Request or Referral Placed     []  External Records Requested     []  Added Reminder to Complete to Upcoming Primary Care Appt Notes     []  Patient Declined     []  Patient Will Call Back to Schedule     []  Patient Will Schedule with External Provider     []  Fit Kit Mailed (add the SmartPhrase under additional notes)     []  Reminded Patient to Complete Home Test             []      Diabetic Eye Exam []  Eye Camera Scheduled or Optometry Referral Placed     []  External Records Requested     []  Added Reminder to Complete to  Upcoming Primary Care Appt Notes     []  Patient Declined     []  Patient Will Call Back to Schedule     []  Patient Will Schedule with External Provider             []      Blood Pressure Control []  Primary Care Follow Up Visit Scheduled     []  Remote Blood Pressure Reading Captured     []  Added Reminder to Complete to Upcoming Primary Care Appt Notes     []  Patient Declined     []  Patient Will Call Back / Patient Will Send Portal Message with Reading     []  Patient Will Call Back to Schedule Provider Visit             []       HbA1c & Other Labs []  Lab Appt Scheduled for Due Labs     []  Primary Care Follow Up Visit Scheduled      []  Reminded Patient to Complete Home Test     []  Added Reminder to Complete to Upcoming Primary Care Appt Notes     []  Patient Declined     []  Patient Will Call Back to Schedule     []  Patient Will Schedule with External Provider / Order Routed if Applicable           []    Schedule Primary Care Appt []  Primary Care Appt Scheduled     []  Patient Declined     []  Patient Will Call Back to Schedule     []  Pt Established with External Provider & Updated Care Team             []      Medication Adherence []  Primary Care Appointment Scheduled     []  Added Reminder to Upcoming Primary Care Appt Notes     []  Patient Reminded to  Prescription     []  Patient Declined, Provider Notified if Needed     []  Sent Provider Message to Review and/or Add Exclusion to Problem List             []      Osteoporosis Screening []  DXA Appointment Scheduled     []  External Records Requested     []  Added Reminder to Complete to Upcoming Primary Care Appt Notes     []  Patient Declined     []  Patient Will Call Back to Schedule     []  Patient Will Schedule with External Provider / Order Routed if Applicable     Additional Care Coordinator Notes:         Further Action Needed If Patient Returns Outreach:

## 2023-10-12 DIAGNOSIS — E03.8 OTHER SPECIFIED HYPOTHYROIDISM: ICD-10-CM

## 2023-10-12 RX ORDER — LEVOTHYROXINE SODIUM 88 UG/1
88 TABLET ORAL DAILY
Qty: 90 TABLET | Refills: 3 | Status: SHIPPED | OUTPATIENT
Start: 2023-10-12

## 2023-10-19 ENCOUNTER — HOSPITAL ENCOUNTER (OUTPATIENT)
Dept: RADIOLOGY | Facility: HOSPITAL | Age: 58
Discharge: HOME | End: 2023-10-19
Attending: FAMILY MEDICINE
Payer: COMMERCIAL

## 2023-10-19 DIAGNOSIS — Z13.6 ENCOUNTER FOR SCREENING FOR CARDIOVASCULAR DISORDERS: ICD-10-CM

## 2023-10-19 DIAGNOSIS — E03.8 OTHER SPECIFIED HYPOTHYROIDISM: ICD-10-CM

## 2023-10-19 DIAGNOSIS — I31.39 PERICARDIAL EFFUSION: Primary | ICD-10-CM

## 2023-10-19 PROCEDURE — 76536 US EXAM OF HEAD AND NECK: CPT | Mod: TC,PO

## 2023-10-19 PROCEDURE — 76536 US THYROID: ICD-10-PCS | Mod: 26,,, | Performed by: RADIOLOGY

## 2023-10-19 PROCEDURE — 76536 US EXAM OF HEAD AND NECK: CPT | Mod: 26,,, | Performed by: RADIOLOGY

## 2023-10-19 PROCEDURE — 75571 CT HRT W/O DYE W/CA TEST: CPT | Mod: TC,PO

## 2023-10-19 PROCEDURE — 75571 CT HRT W/O DYE W/CA TEST: CPT | Mod: 26,,, | Performed by: RADIOLOGY

## 2023-10-19 PROCEDURE — 75571 CT CALCIUM SCORING CARDIAC: ICD-10-PCS | Mod: 26,,, | Performed by: RADIOLOGY

## 2023-10-30 ENCOUNTER — HOSPITAL ENCOUNTER (OUTPATIENT)
Dept: RADIOLOGY | Facility: HOSPITAL | Age: 58
Discharge: HOME | End: 2023-10-30
Attending: FAMILY MEDICINE
Payer: COMMERCIAL

## 2023-10-30 DIAGNOSIS — Z00.01 ANNUAL VISIT FOR GENERAL ADULT MEDICAL EXAMINATION WITH ABNORMAL FINDINGS: ICD-10-CM

## 2023-10-30 DIAGNOSIS — Z12.31 ENCOUNTER FOR SCREENING MAMMOGRAM FOR MALIGNANT NEOPLASM OF BREAST: ICD-10-CM

## 2023-10-30 PROCEDURE — 77067 MAMMO DIGITAL SCREENING BILAT WITH TOMO: ICD-10-PCS | Mod: 26,,, | Performed by: RADIOLOGY

## 2023-10-30 PROCEDURE — 77067 SCR MAMMO BI INCL CAD: CPT | Mod: 26,,, | Performed by: RADIOLOGY

## 2023-10-30 PROCEDURE — 77063 BREAST TOMOSYNTHESIS BI: CPT | Mod: 26,,, | Performed by: RADIOLOGY

## 2023-10-30 PROCEDURE — 77067 SCR MAMMO BI INCL CAD: CPT | Mod: TC,PO

## 2023-10-30 PROCEDURE — 77063 MAMMO DIGITAL SCREENING BILAT WITH TOMO: ICD-10-PCS | Mod: 26,,, | Performed by: RADIOLOGY

## 2023-11-03 ENCOUNTER — CLINICAL SUPPORT (OUTPATIENT)
Dept: CARDIOLOGY | Facility: HOSPITAL | Age: 58
End: 2023-11-03
Attending: FAMILY MEDICINE
Payer: COMMERCIAL

## 2023-11-03 VITALS — WEIGHT: 144 LBS | BODY MASS INDEX: 21.33 KG/M2 | HEIGHT: 69 IN

## 2023-11-03 DIAGNOSIS — I31.39 PERICARDIAL EFFUSION: ICD-10-CM

## 2023-11-03 PROCEDURE — 93306 ECHO (CUPID ONLY): ICD-10-PCS | Mod: 26,,, | Performed by: INTERNAL MEDICINE

## 2023-11-03 PROCEDURE — 93306 TTE W/DOPPLER COMPLETE: CPT | Mod: 26,,, | Performed by: INTERNAL MEDICINE

## 2023-11-03 PROCEDURE — 93306 TTE W/DOPPLER COMPLETE: CPT | Mod: PO

## 2023-11-04 LAB
ASCENDING AORTA: 2.9 CM
AV INDEX (PROSTH): 1.09
AV MEAN GRADIENT: 3 MMHG
AV PEAK GRADIENT: 5 MMHG
AV VALVE AREA BY VELOCITY RATIO: 2.61 CM²
AV VALVE AREA: 3.11 CM²
AV VELOCITY RATIO: 0.91
BSA FOR ECHO PROCEDURE: 1.78 M2
CV ECHO LV RWT: 0.36 CM
DOP CALC AO PEAK VEL: 1.15 M/S
DOP CALC AO VTI: 19.8 CM
DOP CALC LVOT AREA: 2.9 CM2
DOP CALC LVOT DIAMETER: 1.91 CM
DOP CALC LVOT PEAK VEL: 1.05 M/S
DOP CALC LVOT STROKE VOLUME: 61.57 CM3
DOP CALCLVOT PEAK VEL VTI: 21.5 CM
E WAVE DECELERATION TIME: 191.05 MSEC
E/A RATIO: 0.82
E/E' RATIO: 6.82 M/S
ECHO LV POSTERIOR WALL: 0.82 CM (ref 0.6–1.1)
EJECTION FRACTION: 65 %
FRACTIONAL SHORTENING: 34 % (ref 28–44)
INTERVENTRICULAR SEPTUM: 0.85 CM (ref 0.6–1.1)
LEFT ATRIUM SIZE: 3.01 CM
LEFT ATRIUM VOLUME INDEX MOD: 23.8 ML/M2
LEFT ATRIUM VOLUME MOD: 42.79 CM3
LEFT INTERNAL DIMENSION IN SYSTOLE: 3.01 CM (ref 2.1–4)
LEFT VENTRICLE DIASTOLIC VOLUME INDEX: 52.81 ML/M2
LEFT VENTRICLE DIASTOLIC VOLUME: 95.05 ML
LEFT VENTRICLE MASS INDEX: 68 G/M2
LEFT VENTRICLE SYSTOLIC VOLUME INDEX: 19.7 ML/M2
LEFT VENTRICLE SYSTOLIC VOLUME: 35.37 ML
LEFT VENTRICULAR INTERNAL DIMENSION IN DIASTOLE: 4.55 CM (ref 3.5–6)
LEFT VENTRICULAR MASS: 122.44 G
LV LATERAL E/E' RATIO: 6.82 M/S
LV SEPTAL E/E' RATIO: 6.82 M/S
LVOT MG: 2.36 MMHG
LVOT MV: 0.72 CM/S
MV PEAK A VEL: 0.91 M/S
MV PEAK E VEL: 0.75 M/S
MV STENOSIS PRESSURE HALF TIME: 55.4 MS
MV VALVE AREA P 1/2 METHOD: 3.97 CM2
PISA TR MAX VEL: 2.15 M/S
RA PRESSURE ESTIMATED: 3 MMHG
RA VOL SYS: 25.95 ML
RIGHT ATRIAL AREA: 11.9 CM2
RIGHT VENTRICULAR END-DIASTOLIC DIMENSION: 2.78 CM
RIGHT VENTRICULAR LENGTH IN DIASTOLE (APICAL 4-CHAMBER VIEW): 7.44 CM
RV MID DIAMA: 2.22 CM
RV TB RVSP: 5 MMHG
RV TISSUE DOPPLER FREE WALL SYSTOLIC VELOCITY 1 (APICAL 4 CHAMBER VIEW): 13.12 CM/S
SINUS: 3.02 CM
STJ: 2.82 CM
TDI LATERAL: 0.11 M/S
TDI SEPTAL: 0.11 M/S
TDI: 0.11 M/S
TR MAX PG: 18 MMHG
TRICUSPID ANNULAR PLANE SYSTOLIC EXCURSION: 2.41 CM
TV REST PULMONARY ARTERY PRESSURE: 21 MMHG
Z-SCORE OF LEFT VENTRICULAR DIMENSION IN END DIASTOLE: -0.9
Z-SCORE OF LEFT VENTRICULAR DIMENSION IN END SYSTOLE: -0.17

## 2023-12-14 ENCOUNTER — HOSPITAL ENCOUNTER (OUTPATIENT)
Dept: RADIOLOGY | Facility: HOSPITAL | Age: 58
Discharge: HOME | End: 2023-12-14
Attending: ORTHOPAEDIC SURGERY
Payer: COMMERCIAL

## 2023-12-14 DIAGNOSIS — M25.571 PAIN IN JOINT INVOLVING RIGHT ANKLE AND FOOT: ICD-10-CM

## 2023-12-14 PROCEDURE — 73721 MRI JNT OF LWR EXTRE W/O DYE: CPT | Mod: 26,RT,, | Performed by: RADIOLOGY

## 2023-12-14 PROCEDURE — 73721 MRI ANKLE WITHOUT CONTRAST RIGHT: ICD-10-PCS | Mod: 26,RT,, | Performed by: RADIOLOGY

## 2023-12-14 PROCEDURE — 73721 MRI JNT OF LWR EXTRE W/O DYE: CPT | Mod: TC,PO,RT

## 2023-12-15 ENCOUNTER — OFFICE VISIT (OUTPATIENT)
Dept: ORTHOPEDICS | Facility: CLINIC | Age: 58
End: 2023-12-15
Payer: COMMERCIAL

## 2023-12-15 DIAGNOSIS — S93.421A SPRAIN OF DELTOID LIGAMENT OF RIGHT ANKLE, INITIAL ENCOUNTER: ICD-10-CM

## 2023-12-15 DIAGNOSIS — S93.491A SPRAIN OF ANTERIOR TALOFIBULAR LIGAMENT OF RIGHT ANKLE, INITIAL ENCOUNTER: Primary | ICD-10-CM

## 2023-12-15 DIAGNOSIS — M65.9 SYNOVITIS OF RIGHT ANKLE: ICD-10-CM

## 2023-12-15 PROCEDURE — 20605 DRAIN/INJ JOINT/BURSA W/O US: CPT | Mod: RT,S$GLB,, | Performed by: ORTHOPAEDIC SURGERY

## 2023-12-15 PROCEDURE — 1159F PR MEDICATION LIST DOCUMENTED IN MEDICAL RECORD: ICD-10-PCS | Mod: CPTII,S$GLB,, | Performed by: ORTHOPAEDIC SURGERY

## 2023-12-15 PROCEDURE — 99999 PR PBB SHADOW E&M-EST. PATIENT-LVL II: CPT | Mod: PBBFAC,,, | Performed by: ORTHOPAEDIC SURGERY

## 2023-12-15 PROCEDURE — 99214 OFFICE O/P EST MOD 30 MIN: CPT | Mod: 25,S$GLB,, | Performed by: ORTHOPAEDIC SURGERY

## 2023-12-15 PROCEDURE — 1160F PR REVIEW ALL MEDS BY PRESCRIBER/CLIN PHARMACIST DOCUMENTED: ICD-10-PCS | Mod: CPTII,S$GLB,, | Performed by: ORTHOPAEDIC SURGERY

## 2023-12-15 PROCEDURE — 1159F MED LIST DOCD IN RCRD: CPT | Mod: CPTII,S$GLB,, | Performed by: ORTHOPAEDIC SURGERY

## 2023-12-15 PROCEDURE — 1160F RVW MEDS BY RX/DR IN RCRD: CPT | Mod: CPTII,S$GLB,, | Performed by: ORTHOPAEDIC SURGERY

## 2023-12-15 PROCEDURE — 20605 INTERMEDIATE JOINT ASPIRATION/INJECTION: R ANKLE: ICD-10-PCS | Mod: RT,S$GLB,, | Performed by: ORTHOPAEDIC SURGERY

## 2023-12-15 PROCEDURE — 99214 PR OFFICE/OUTPT VISIT, EST, LEVL IV, 30-39 MIN: ICD-10-PCS | Mod: 25,S$GLB,, | Performed by: ORTHOPAEDIC SURGERY

## 2023-12-15 PROCEDURE — 99999 PR PBB SHADOW E&M-EST. PATIENT-LVL II: ICD-10-PCS | Mod: PBBFAC,,, | Performed by: ORTHOPAEDIC SURGERY

## 2023-12-15 RX ADMIN — TRIAMCINOLONE ACETONIDE 40 MG: 40 INJECTION, SUSPENSION INTRA-ARTICULAR; INTRAMUSCULAR at 08:12

## 2023-12-15 NOTE — PROGRESS NOTES
Status/Diagnosis: Right ATFL/Deltoid ligament sprain. Right ankle synovitis.  Date of Surgery: none  Date of Injury: 08/11/2023  Return visit: PRN   X-rays on Return: pending patient complaint    Chief Complaint:   Right ankle pain/swelling.    Present History:  Kristen Welch is a 58 y.o. female who presents who sustained a right ankle injury last week.  Immediate pain and swelling but still able to bear weight.  No recent injury or complaints chronic ankle instability.  Denies any numbness or tingling.    12/15/2023:  Patient returns today after last being seen in August 2023.  Patient has completed physical therapy.  Wearing the lace-up ankle brace intermittently.  Endorses 2/10 pain localized to the ATFL.  Still with moderate waxing and waning diffuse ankle swelling.  No new injuries.  Denies any numbness or tingling.  No subjective ankle instability.  Mentioned an upcoming trip and April to [place].      Past Medical History:   Diagnosis Date    Thyroid disease        Past Surgical History:   Procedure Laterality Date    AUGMENTATION OF BREAST Bilateral     age 42    BREAST SURGERY         Current Outpatient Medications   Medication Sig    cetirizine (ZYRTEC) 10 MG tablet Take 10 mg by mouth once daily.    levothyroxine (SYNTHROID) 88 MCG tablet Take 1 tablet (88 mcg total) by mouth once daily.    progesterone (PROMETRIUM) 200 MG capsule Take 1 capsule (200 mg total) by mouth daily at bedtime.     No current facility-administered medications for this visit.       Review of patient's allergies indicates:   Allergen Reactions    Pcn [penicillins]        Family History   Adopted: Yes       Social History     Socioeconomic History    Marital status:    Tobacco Use    Smoking status: Never    Smokeless tobacco: Never   Substance and Sexual Activity    Alcohol use: Yes     Comment: every other day    Drug use: Never    Sexual activity: Yes     Social Determinants of Health     Financial Resource Strain: Patient  Declined (12/15/2023)    Overall Financial Resource Strain (CARDIA)     Difficulty of Paying Living Expenses: Patient declined   Food Insecurity: Patient Declined (12/15/2023)    Hunger Vital Sign     Worried About Running Out of Food in the Last Year: Patient declined     Ran Out of Food in the Last Year: Patient declined   Transportation Needs: Patient Declined (12/15/2023)    PRAPARE - Transportation     Lack of Transportation (Medical): Patient declined     Lack of Transportation (Non-Medical): Patient declined   Physical Activity: Insufficiently Active (12/15/2023)    Exercise Vital Sign     Days of Exercise per Week: 3 days     Minutes of Exercise per Session: 20 min   Stress: No Stress Concern Present (12/15/2023)    Citizen of the [place] of Occupational Health - Occupational Stress Questionnaire     Feeling of Stress : Not at all   Social Connections: Unknown (12/15/2023)    Social Connection and Isolation Panel [NHANES]     Frequency of Communication with Friends and Family: More than three times a week     Frequency of Social Gatherings with Friends and Family: Three times a week     Active Member of Clubs or Organizations: No     Attends Club or Organization Meetings: Never     Marital Status:    Housing Stability: Low Risk  (12/15/2023)    Housing Stability Vital Sign     Unable to Pay for Housing in the Last Year: No     Number of Places Lived in the Last Year: 1     Unstable Housing in the Last Year: No       Physical exam:  There were no vitals filed for this visit.  There is no height or weight on file to calculate BMI.  General: In no apparent distress; well developed and well nourished.  HEENT: normocephalic; atraumatic.  Cardiovascular: regular rate.  Respiratory: no increased work of breathing.  Musculoskeletal:   Gait: mild antalgic  Inspection:   Still with moderate diffuse swelling about the right ankle, lateral more so than medial.  Mild-to-moderate tenderness at the ATFL origin.  Minimal  tenderness of the deltoid today on repeat exam.  Still with equivocal laxity on anterior drawer and external rotation anterior drawer. No pain referable to the foot.  Silfverskiold: negative  Alignment:  Knee: neutral               Ankle: neutral              Hindfoot: neutral              Forefoot: neutral   Strength:              Dorsiflexion 5/5  Plantar flexion 5/5  Inversion 5/5   Eversion 5/5   Sensation:              SILT distally   ROM:              Ankle and subtalar: near full with pain at extremes  Pulses: 2+ DP/PT pulses.                   Imaging Studies/Outside documentation:  I have ordered/reviewed/interpreted the following images/outside documentation:  MRI Right ankle w/o contrast:  On my independent review, difficult to visualize the ATFL and CFL with findings suggestive of chronic tear.  Similarly the deep deltoid ligament is difficult to visualize also suggestive of chronic injury.  Several distinct areas tenosynovitis involving the FDL, FHL, and posterior tibial tendons.  Also with moderate tibialis anterior tenosynovitis but only distal to the navicular.   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     Assessment:  Kristen Welch is a 58 y.o. female with Right ATFL/Deltoid ligament sprain. Right ankle synovitis.     Plan:   Clinical and radiographic findings were discussed at length today.  While patient  Does have MR findings suggestive of chronic low ankle sprain, no subjective instability.  Patient with no clear etiology for persistent right ankle swelling.  We will perform a diagnostic/therapeutic right ankle intra-articular corticosteroid injection.  Patient tolerated this well.  See procedure note for details.  Continue lace-up ankle brace as needed.    Patient will contact my office with level of improvement going forward.  Should symptoms persist or worsen, we did discuss potential for right ankle  arthroscopy with debridement and a Brostrom type lateral ligament repair.    Patient voiced understanding.  All questions were answered.      This note was created using voice recognition software and may contain grammatical errors.

## 2023-12-19 RX ORDER — TRIAMCINOLONE ACETONIDE 40 MG/ML
40 INJECTION, SUSPENSION INTRA-ARTICULAR; INTRAMUSCULAR
Status: DISCONTINUED | OUTPATIENT
Start: 2023-12-15 | End: 2023-12-19 | Stop reason: HOSPADM

## 2023-12-19 NOTE — PROCEDURES
Intermediate Joint Aspiration/Injection: R ankle    Date/Time: 12/15/2023 8:15 AM    Performed by: Tj Servin MD  Authorized by: Tj Servin MD    Consent Done?:  Yes (Verbal)  Indications:  Diagnostic evaluation and pain  Site marked: The procedure site was marked    Timeout: Prior to procedure the correct patient, procedure, and site was verified      Location:  Ankle  Site:  R ankle  Prep: Patient was prepped and draped in usual sterile fashion    Ultrasonic Guidance for needle placement: No  Needle size:  25 G  Approach:  Anteromedial  Medications:  40 mg triamcinolone acetonide 40 mg/mL  Patient tolerance:  Patient tolerated the procedure well with no immediate complications

## 2024-02-28 ENCOUNTER — OFFICE VISIT (OUTPATIENT)
Dept: OPTOMETRY | Facility: CLINIC | Age: 59
End: 2024-02-28
Payer: COMMERCIAL

## 2024-02-28 DIAGNOSIS — H52.03 HYPEROPIA WITH ASTIGMATISM AND PRESBYOPIA, BILATERAL: ICD-10-CM

## 2024-02-28 DIAGNOSIS — H52.4 HYPEROPIA WITH ASTIGMATISM AND PRESBYOPIA, BILATERAL: ICD-10-CM

## 2024-02-28 DIAGNOSIS — H52.203 HYPEROPIA WITH ASTIGMATISM AND PRESBYOPIA, BILATERAL: ICD-10-CM

## 2024-02-28 DIAGNOSIS — Z13.5 GLAUCOMA SCREENING: ICD-10-CM

## 2024-02-28 DIAGNOSIS — Z01.00 EXAMINATION OF EYES AND VISION: Primary | ICD-10-CM

## 2024-02-28 DIAGNOSIS — H43.393 VITREOUS FLOATERS, BILATERAL: ICD-10-CM

## 2024-02-28 PROCEDURE — 92012 INTRM OPH EXAM EST PATIENT: CPT | Mod: S$GLB,,, | Performed by: OPTOMETRIST

## 2024-02-28 PROCEDURE — 99999 PR PBB SHADOW E&M-EST. PATIENT-LVL III: CPT | Mod: PBBFAC,,, | Performed by: OPTOMETRIST

## 2024-02-28 PROCEDURE — 92015 DETERMINE REFRACTIVE STATE: CPT | Mod: S$GLB,,, | Performed by: OPTOMETRIST

## 2024-02-28 PROCEDURE — 1159F MED LIST DOCD IN RCRD: CPT | Mod: CPTII,S$GLB,, | Performed by: OPTOMETRIST

## 2024-02-28 NOTE — PATIENT INSTRUCTIONS
"DRY EYES -- BURNING OR JUAN MIGUEL SYMPTOMS:  Use Over The Counter artificial tears as needed for dry eye symptoms.   Some common brands include:  Systane, Optive, Refresh, and Thera-Tears.  These drops can be used as frequently as desired, but may be most helpful use during long periods of concentrated work.  For example, reading / working at the computer. Start with 3-4x per day.     Nighttime Ophthalmic gel or ointments are available: Refresh PM, Genteal, and Lacrilube.    Avoid drops that "get redness out" (Visine, Murine, Clear Eyes), as these may contain medication that could further irritate the eyes, especially with chronic use.    ALLERGY EYES -- ITCHING SYMPTOMS:  Over the counter medications include--Pataday, Zaditor, and Alaway.  Use as directed 1-2 drops daily for symptoms of itching / watering eyes.  These drops will not help for dry eye or exposure symptoms.    REDNESS RELIEF:  Lumify---is a good redness reliever that will not cause irritation if used chronically.       FLASHES / FLOATERS / POSTERIOR VITREOUS DETACHMENT    Call the clinic if you have any further changes in symptoms.  Including:  Increased numbers of floaters or flashing lights, dimness or darkness that moves through or stays constant in your vision, or any pain in the eye (s).    You may sometimes see small specks or clouds moving in your field of vision.  They are called FLOATERS.  You can often see them when looking at a plain background, like a blank wall or blue cornel.  Floaters are actually tiny clumps of gel or cells inside the VITREOUS, the clear jelly-like fluid that fills the inside of your eye.    While these objects look like they are in front of your eye, they are actually floating inside.  What you see are the shadows they cast on the RETINA, the nerve layer at the back of the eye that senses light and allows you to see.      POSTERIOR VITREOUS DETACHMENT    The appearance of new floaters may be alarming.  If you suddenly develop " new floaters, you should contact your eye care professional  right away.    The retina can tear if the shrinking vitreous pulls away from the wall of the eye.  This sometimes causes a small amount of bleeding in the eye that may appear as new floaters.    A torn retina is always a serious problem, since it can lead to a retinal detachment.  You should see your eye care professional as soon as possible if:    even one new floater appears suddenly;  you see sudden flashes of light;  you notice other symptoms, like the loss of side vision, or a curtain closes down in your vision        POSTERIOR VITREOUS DETACHMENT is more common for people who:    are nearsighted;  have had cataract surgery;  have had YAG laser surgery of the eye;  have had inflammation inside the eye;  are over age 60.      While some floaters may remain visible, many of them will fade over time and become less noticeable.  Even if you've had some floaters for years, you should have your eyes checked as soon as possible if you notice new ones.    FLASHING LIGHTS    When the vitreous gel rubs or pulls on the retina, you may see what look like flashing lights or lightning streaks.  These flashes can appear off and on for several weeks or months.      Some people experience flashes of light that appear as jagged lines or heat waves in both eyes, lasting 10-20 minutes.  These flashes are caused by a spasm of blood vessels in the brain, which is called a migraine.    If a headache follows these flashes, it's called a migraine headache.  If   no headache occurs, these flashes are called Ophthalmic or Ocular Migraine.

## 2024-02-28 NOTE — PROGRESS NOTES
HPI    Routine eye exam-dle-2/23    Pt denies any blurred va. No flashes or floaters. Needing updated rx.   Using visine allergy gtts prn.   Last edited by Isabelle Bill on 2/28/2024  8:02 AM.            Assessment /Plan     For exam results, see Encounter Report.    Examination of eyes and vision    Vitreous floaters, bilateral    Glaucoma screening    Hyperopia with astigmatism and presbyopia, bilateral      Ocular health exam OU --- pt defers DFE --OPTOS 2/2024 w/ normal findings   2.   RD precautions given and reviewed. Patient knows to call/ message if any further changes in symptoms occur.  3.   Not suspect   4.   Updated specs rx, gave copy, fill prn     Had tried MF scl's ---did not like and will not pursue refit     Discussed and educated patient on current findings /plan.  RTC 1 year, prn if any changes / issues

## 2024-03-19 ENCOUNTER — OFFICE VISIT (OUTPATIENT)
Dept: ORTHOPEDICS | Facility: CLINIC | Age: 59
End: 2024-03-19
Payer: COMMERCIAL

## 2024-03-19 DIAGNOSIS — M65.9 SYNOVITIS OF RIGHT ANKLE: Primary | ICD-10-CM

## 2024-03-19 PROCEDURE — 99999 PR PBB SHADOW E&M-EST. PATIENT-LVL II: CPT | Mod: PBBFAC,,, | Performed by: ORTHOPAEDIC SURGERY

## 2024-03-19 PROCEDURE — 20605 DRAIN/INJ JOINT/BURSA W/O US: CPT | Mod: RT,S$GLB,, | Performed by: ORTHOPAEDIC SURGERY

## 2024-03-19 PROCEDURE — 1159F MED LIST DOCD IN RCRD: CPT | Mod: CPTII,S$GLB,, | Performed by: ORTHOPAEDIC SURGERY

## 2024-03-19 PROCEDURE — 99213 OFFICE O/P EST LOW 20 MIN: CPT | Mod: 25,S$GLB,, | Performed by: ORTHOPAEDIC SURGERY

## 2024-03-19 PROCEDURE — 1160F RVW MEDS BY RX/DR IN RCRD: CPT | Mod: CPTII,S$GLB,, | Performed by: ORTHOPAEDIC SURGERY

## 2024-03-19 RX ORDER — TRIAMCINOLONE ACETONIDE 40 MG/ML
40 INJECTION, SUSPENSION INTRA-ARTICULAR; INTRAMUSCULAR
Status: DISCONTINUED | OUTPATIENT
Start: 2024-03-19 | End: 2024-03-19 | Stop reason: HOSPADM

## 2024-03-19 RX ADMIN — TRIAMCINOLONE ACETONIDE 40 MG: 40 INJECTION, SUSPENSION INTRA-ARTICULAR; INTRAMUSCULAR at 10:03

## 2024-03-19 NOTE — PROCEDURES
Intermediate Joint Aspiration/Injection: R ankle    Date/Time: 3/19/2024 10:15 AM    Performed by: Tj Servin MD  Authorized by: Tj Servin MD    Consent Done?:  Yes (Verbal)  Indications:  Diagnostic evaluation, pain and joint swelling  Site marked: The procedure site was marked    Timeout: Prior to procedure the correct patient, procedure, and site was verified      Location:  Ankle  Site:  R ankle  Prep: Patient was prepped and draped in usual sterile fashion    Ultrasonic Guidance for needle placement: No  Needle size:  25 G  Approach:  Anteromedial  Medications:  40 mg triamcinolone acetonide 40 mg/mL  Patient tolerance:  Patient tolerated the procedure well with no immediate complications

## 2024-03-19 NOTE — PROGRESS NOTES
Status/Diagnosis: Right ATFL/Deltoid ligament sprain. Right ankle synovitis.  Date of Surgery: none  Date of Injury: 08/11/2023  Return visit: PRN   X-rays on Return: pending patient complaint    Chief Complaint:   Right ankle pain/swelling.    Present History:  Kristen Welch is a 58 y.o. female who presents who sustained a right ankle injury last week.  Immediate pain and swelling but still able to bear weight.  No recent injury or complaints chronic ankle instability.  Denies any numbness or tingling.    12/15/2023:  Patient returns today after last being seen in August 2023.  Patient has completed physical therapy.  Wearing the lace-up ankle brace intermittently.  Endorses 2/10 pain localized to the ATFL.  Still with moderate waxing and waning diffuse ankle swelling.  No new injuries.  Denies any numbness or tingling.  No subjective ankle instability.  Mentioned an upcoming trip in April to [place].    03/19/2024:  Patient returns today for repeat clinical evaluation and possible repeat intra-articular ankle steroid injection.  Injection performed in December 2023 provided significant, near-complete, symptomatic relief for 8+ weeks.  This helped both with pain and with swelling.  During this 8 weeks, patient reports ankle appearance with symmetric.    Over the last 4 weeks patient has had increased swelling and pain with no new injury or other inciting event.  Still with no subjective ankle instability.  Upcoming trip to [place] next month -- inquiring about repeat injection prior.      Past Medical History:   Diagnosis Date    Thyroid disease        Past Surgical History:   Procedure Laterality Date    AUGMENTATION OF BREAST Bilateral     age 42    BREAST SURGERY         Current Outpatient Medications   Medication Sig    cetirizine (ZYRTEC) 10 MG tablet Take 10 mg by mouth once daily.    levothyroxine (SYNTHROID) 88 MCG tablet Take 1 tablet (88 mcg total) by mouth once daily.    progesterone (PROMETRIUM) 200 MG  capsule Take 1 capsule (200 mg total) by mouth daily at bedtime.     No current facility-administered medications for this visit.       Review of patient's allergies indicates:   Allergen Reactions    Pcn [penicillins]        Family History   Adopted: Yes       Social History     Socioeconomic History    Marital status:    Tobacco Use    Smoking status: Never    Smokeless tobacco: Never   Substance and Sexual Activity    Alcohol use: Yes     Comment: every other day    Drug use: Never    Sexual activity: Yes     Social Determinants of Health     Financial Resource Strain: Patient Declined (12/15/2023)    Overall Financial Resource Strain (CARDIA)     Difficulty of Paying Living Expenses: Patient declined   Food Insecurity: Patient Declined (12/15/2023)    Hunger Vital Sign     Worried About Running Out of Food in the Last Year: Patient declined     Ran Out of Food in the Last Year: Patient declined   Transportation Needs: Patient Declined (12/15/2023)    PRAPARE - Transportation     Lack of Transportation (Medical): Patient declined     Lack of Transportation (Non-Medical): Patient declined   Physical Activity: Insufficiently Active (12/15/2023)    Exercise Vital Sign     Days of Exercise per Week: 3 days     Minutes of Exercise per Session: 20 min   Stress: No Stress Concern Present (12/15/2023)    [employer] - Occupational Stress Questionnaire     Feeling of Stress : Not at all   Social Connections: Unknown (12/15/2023)    Social Connection and Isolation Panel [NHANES]     Frequency of Communication with Friends and Family: More than three times a week     Frequency of Social Gatherings with Friends and Family: Three times a week     Active Member of Clubs or Organizations: No     Attends Club or Organization Meetings: Never     Marital Status:    Housing Stability: Low Risk  (12/15/2023)    Housing Stability Vital Sign     Unable to Pay for Housing in the Last Year: No      Number of Places Lived in the Last Year: 1     Unstable Housing in the Last Year: No       Physical exam:  There were no vitals filed for this visit.  There is no height or weight on file to calculate BMI.  General: In no apparent distress; well developed and well nourished.  HEENT: normocephalic; atraumatic.  Cardiovascular: regular rate.  Respiratory: no increased work of breathing.  Musculoskeletal:   Gait:  Nonantalgic  Inspection:   Moderate recurrent diffuse ankle swelling on the right, mostly anteriorly.  No warmth or erythema.  No signs or symptoms of infection.  Mild-to-moderate tenderness at the ATFL origin. Little to no tenderness of the deltoid today on repeat exam.  Equivocal laxity on anterior drawer and external rotation anterior drawer. No pain referable to the foot.  Silfverskiold: negative  Alignment:  Knee: neutral               Ankle: neutral              Hindfoot: neutral              Forefoot: neutral   Strength:              Dorsiflexion 5/5  Plantar flexion 5/5  Inversion 5/5   Eversion 5/5   Sensation:              SILT distally   ROM:              Ankle and subtalar: near full with pain only at extremes  Pulses: 2+ DP/PT pulses.                   Imaging Studies/Outside documentation:  I have ordered/reviewed/interpreted the following images/outside documentation:  No new imaging today.  Assessment:  Kristen Welch is a 58 y.o. female with Right ATFL/Deltoid ligament sprain. Right ankle synovitis.     Plan:   As noted in HPI, patient with near-complete symptomatic relief for a +weeks after previous right ankle intra-articular corticosteroid injection.    Still with no clear etiology for recurrent ankle swelling >> pain.    Again discussed the potential for future surgical intervention would likely include but not be limited to right ankle arthroscopy with debridement and a Brostrom type lateral ligament repair.    Given upcoming trip to [place], repeat corticosteroid injection performed  today without complication.  Patient tolerated this well.  See procedure note for details.    Patient voiced understanding.  All questions were answered.  She will contact my office regarding future follow-up.      This note was created using voice recognition software and may contain grammatical errors.

## 2024-10-07 ENCOUNTER — OFFICE VISIT (OUTPATIENT)
Dept: URGENT CARE | Facility: CLINIC | Age: 59
End: 2024-10-07
Payer: COMMERCIAL

## 2024-10-07 VITALS
HEIGHT: 69 IN | RESPIRATION RATE: 20 BRPM | BODY MASS INDEX: 21.33 KG/M2 | WEIGHT: 144 LBS | HEART RATE: 100 BPM | SYSTOLIC BLOOD PRESSURE: 113 MMHG | DIASTOLIC BLOOD PRESSURE: 74 MMHG | TEMPERATURE: 99 F | OXYGEN SATURATION: 96 %

## 2024-10-07 DIAGNOSIS — J10.1 INFLUENZA A: Primary | ICD-10-CM

## 2024-10-07 DIAGNOSIS — R11.2 NAUSEA AND VOMITING, UNSPECIFIED VOMITING TYPE: ICD-10-CM

## 2024-10-07 DIAGNOSIS — R05.9 COUGH, UNSPECIFIED TYPE: ICD-10-CM

## 2024-10-07 DIAGNOSIS — R51.9 NONINTRACTABLE HEADACHE, UNSPECIFIED CHRONICITY PATTERN, UNSPECIFIED HEADACHE TYPE: ICD-10-CM

## 2024-10-07 DIAGNOSIS — R52 GENERALIZED BODY ACHES: ICD-10-CM

## 2024-10-07 LAB
CTP QC/QA: YES
CTP QC/QA: YES
POC MOLECULAR INFLUENZA A AGN: POSITIVE
POC MOLECULAR INFLUENZA B AGN: NEGATIVE
SARS-COV-2 AG RESP QL IA.RAPID: NEGATIVE

## 2024-10-07 PROCEDURE — 87502 INFLUENZA DNA AMP PROBE: CPT | Mod: QW,S$GLB,, | Performed by: PHYSICIAN ASSISTANT

## 2024-10-07 PROCEDURE — 99213 OFFICE O/P EST LOW 20 MIN: CPT | Mod: 25,S$GLB,, | Performed by: PHYSICIAN ASSISTANT

## 2024-10-07 PROCEDURE — 96372 THER/PROPH/DIAG INJ SC/IM: CPT | Mod: S$GLB,,, | Performed by: PHYSICIAN ASSISTANT

## 2024-10-07 PROCEDURE — 87811 SARS-COV-2 COVID19 W/OPTIC: CPT | Mod: QW,S$GLB,, | Performed by: PHYSICIAN ASSISTANT

## 2024-10-07 RX ORDER — LORATADINE 10 MG
10 TABLET,DISINTEGRATING ORAL DAILY
COMMUNITY

## 2024-10-07 RX ORDER — KETOROLAC TROMETHAMINE 30 MG/ML
30 INJECTION, SOLUTION INTRAMUSCULAR; INTRAVENOUS
Status: COMPLETED | OUTPATIENT
Start: 2024-10-07 | End: 2024-10-07

## 2024-10-07 RX ORDER — ONDANSETRON 2 MG/ML
4 INJECTION INTRAMUSCULAR; INTRAVENOUS
Status: COMPLETED | OUTPATIENT
Start: 2024-10-07 | End: 2024-10-07

## 2024-10-07 RX ORDER — BALOXAVIR MARBOXIL 40 MG/1
40 TABLET, FILM COATED ORAL ONCE
Qty: 1 TABLET | Refills: 0 | Status: SHIPPED | OUTPATIENT
Start: 2024-10-07 | End: 2024-10-11 | Stop reason: ALTCHOICE

## 2024-10-07 RX ORDER — ONDANSETRON 4 MG/1
4 TABLET, ORALLY DISINTEGRATING ORAL EVERY 8 HOURS PRN
Qty: 30 TABLET | Refills: 0 | Status: SHIPPED | OUTPATIENT
Start: 2024-10-07 | End: 2024-10-11 | Stop reason: ALTCHOICE

## 2024-10-07 RX ADMIN — ONDANSETRON 4 MG: 2 INJECTION INTRAMUSCULAR; INTRAVENOUS at 08:10

## 2024-10-07 RX ADMIN — KETOROLAC TROMETHAMINE 30 MG: 30 INJECTION, SOLUTION INTRAMUSCULAR; INTRAVENOUS at 08:10

## 2024-10-07 NOTE — PROGRESS NOTES
"Subjective:      Patient ID: Kristen Welch is a 59 y.o. female.    Vitals:  height is 5' 9" (1.753 m) and weight is 65.3 kg (144 lb). Her oral temperature is 98.6 °F (37 °C). Her blood pressure is 113/74 and her pulse is 100. Her respiration is 20 and oxygen saturation is 96%.     Chief Complaint: Fever    Pt presents with c/o fever, headaches, chest congestion, vomiting, slight cough Onset-Yesterday. Pt states has taken tylenol cold/flu. Pain score 5/10   Pt states just came back from [place]     Fever   This is a new problem. The current episode started 1 day ago. The problem occurs cycles. The problem has been waxing and waning. She has not experienced a heat injury.The maximum temperature noted was 99 to 99.9 F. The temperature was taken using an oral thermometer. Associated symptoms include congestion, coughing, headaches, nausea and vomiting. Pertinent negatives include no abdominal pain, chest pain, diarrhea, ear pain, muscle aches, not playful when afebrile, rash, sleepiness, sore throat, urinary pain or wheezing. She has tried acetaminophen for the symptoms. The treatment provided no relief.       Constitution: Positive for fatigue and fever. Negative for chills and sweating.   HENT:  Positive for congestion, postnasal drip and sinus pressure. Negative for ear pain, drooling, nosebleeds, foreign body in nose, sinus pain, sore throat, trouble swallowing and voice change.    Neck: Negative for neck pain, neck stiffness, painful lymph nodes and neck swelling.   Cardiovascular:  Negative for chest pain, leg swelling, palpitations, sob on exertion and passing out.   Eyes:  Negative for eye pain, eye redness, photophobia, double vision, blurred vision and eyelid swelling.   Respiratory:  Positive for cough. Negative for chest tightness, sputum production, bloody sputum, shortness of breath, stridor and wheezing.    Gastrointestinal:  Positive for nausea and vomiting. Negative for abdominal pain, abdominal " bloating, constipation, diarrhea and heartburn.   Genitourinary:  Negative for dysuria, frequency, urgency, flank pain, hematuria and pelvic pain.   Musculoskeletal:  Positive for muscle ache. Negative for joint pain, joint swelling, abnormal ROM of joint, back pain and muscle cramps.   Skin:  Negative for rash and hives.   Allergic/Immunologic: Negative for seasonal allergies, food allergies, hives, itching and sneezing.   Neurological:  Positive for headaches. Negative for dizziness, light-headedness, passing out, facial drooping, speech difficulty, loss of balance, altered mental status, loss of consciousness and seizures.   Hematologic/Lymphatic: Negative for swollen lymph nodes.   Psychiatric/Behavioral:  Negative for altered mental status and nervous/anxious. The patient is not nervous/anxious.       Objective:     Physical Exam   Constitutional: She is oriented to person, place, and time. She appears well-developed. She is cooperative.  Non-toxic appearance. She does not appear ill. No distress.   HENT:   Head: Normocephalic and atraumatic.   Ears:   Right Ear: Hearing, tympanic membrane, external ear and ear canal normal.   Left Ear: Hearing, tympanic membrane, external ear and ear canal normal.   Nose: Mucosal edema and rhinorrhea present. No nasal deformity. No epistaxis. Right sinus exhibits no maxillary sinus tenderness and no frontal sinus tenderness. Left sinus exhibits no maxillary sinus tenderness and no frontal sinus tenderness.   Mouth/Throat: Uvula is midline and mucous membranes are normal. No trismus in the jaw. Normal dentition. No uvula swelling. Posterior oropharyngeal erythema and cobblestoning present. No oropharyngeal exudate, posterior oropharyngeal edema or tonsillar abscesses. No tonsillar exudate.   Eyes: Conjunctivae, EOM and lids are normal. Pupils are equal, round, and reactive to light. No scleral icterus.   Neck: Trachea normal and phonation normal. Neck supple. No edema present.  No erythema present. No neck rigidity present.   Cardiovascular: Normal rate, regular rhythm, normal heart sounds and normal pulses.   Pulmonary/Chest: Effort normal and breath sounds normal. No accessory muscle usage or stridor. No respiratory distress. She has no decreased breath sounds. She has no wheezes. She has no rhonchi. She has no rales.   Abdominal: Normal appearance and bowel sounds are normal. She exhibits no distension. Soft. There is no abdominal tenderness. There is no rebound, no guarding, no left CVA tenderness and no right CVA tenderness.   Musculoskeletal: Normal range of motion.         General: No deformity or edema. Normal range of motion.   Lymphadenopathy:     She has no cervical adenopathy.   Neurological: She is alert and oriented to person, place, and time. No cranial nerve deficit. She exhibits normal muscle tone. Coordination normal.   Skin: Skin is warm, dry, intact, not diaphoretic, not pale and no rash. Capillary refill takes less than 2 seconds.   Psychiatric: Her speech is normal and behavior is normal. Judgment and thought content normal.   Nursing note and vitals reviewed.      Results for orders placed or performed in visit on 10/07/24   SARS Coronavirus 2 Antigen, POCT Manual Read    Collection Time: 10/07/24  8:19 AM   Result Value Ref Range    SARS Coronavirus 2 Antigen Negative Negative     Acceptable Yes    POCT Influenza A/B MOLECULAR    Collection Time: 10/07/24  8:46 AM   Result Value Ref Range    POC Molecular Influenza A Ag Positive (A) Negative    POC Molecular Influenza B Ag Negative Negative     Acceptable Yes      Assessment:     1. Influenza A    2. Cough, unspecified type    3. Generalized body aches    4. Nausea and vomiting, unspecified vomiting type    5. Nonintractable headache, unspecified chronicity pattern, unspecified headache type      Plan:   Discussed test results done in clinic today with patient. Advised close follow-up  with PCP and/or Specialist for further evaluation as needed. ER precautions given to patient as well. Patient aware, verbalized understanding and agreed with plan of care.    Influenza A  -     baloxavir marboxiL (XOFLUZA) 40 mg tablet; Take 1 tablet (40 mg total) by mouth once. for 1 dose  Dispense: 1 tablet; Refill: 0    Cough, unspecified type  -     SARS Coronavirus 2 Antigen, POCT Manual Read    Generalized body aches  -     SARS Coronavirus 2 Antigen, POCT Manual Read  -     POCT Influenza A/B MOLECULAR    Nausea and vomiting, unspecified vomiting type  -     SARS Coronavirus 2 Antigen, POCT Manual Read  -     ondansetron injection 4 mg  -     POCT Influenza A/B MOLECULAR  -     ondansetron (ZOFRAN-ODT) 4 MG TbDL; Take 1 tablet (4 mg total) by mouth every 8 (eight) hours as needed (nausea/vomiting).  Dispense: 30 tablet; Refill: 0    Nonintractable headache, unspecified chronicity pattern, unspecified headache type  -     SARS Coronavirus 2 Antigen, POCT Manual Read  -     ketorolac injection 30 mg  -     POCT Influenza A/B MOLECULAR        There are no Patient Instructions on file for this visit.

## 2024-10-11 ENCOUNTER — OFFICE VISIT (OUTPATIENT)
Dept: FAMILY MEDICINE | Facility: CLINIC | Age: 59
End: 2024-10-11
Payer: COMMERCIAL

## 2024-10-11 ENCOUNTER — LAB VISIT (OUTPATIENT)
Dept: LAB | Facility: HOSPITAL | Age: 59
End: 2024-10-11
Attending: FAMILY MEDICINE
Payer: COMMERCIAL

## 2024-10-11 VITALS
OXYGEN SATURATION: 98 % | SYSTOLIC BLOOD PRESSURE: 110 MMHG | WEIGHT: 142.88 LBS | HEART RATE: 72 BPM | DIASTOLIC BLOOD PRESSURE: 82 MMHG | HEIGHT: 69 IN | BODY MASS INDEX: 21.16 KG/M2

## 2024-10-11 DIAGNOSIS — Z12.31 ENCOUNTER FOR SCREENING MAMMOGRAM FOR MALIGNANT NEOPLASM OF BREAST: ICD-10-CM

## 2024-10-11 DIAGNOSIS — E03.8 OTHER SPECIFIED HYPOTHYROIDISM: ICD-10-CM

## 2024-10-11 DIAGNOSIS — Z00.01 ANNUAL VISIT FOR GENERAL ADULT MEDICAL EXAMINATION WITH ABNORMAL FINDINGS: Primary | ICD-10-CM

## 2024-10-11 DIAGNOSIS — B96.89 BACTERIAL SINUSITIS: ICD-10-CM

## 2024-10-11 DIAGNOSIS — J04.0 LARYNGITIS: ICD-10-CM

## 2024-10-11 DIAGNOSIS — Z00.01 ANNUAL VISIT FOR GENERAL ADULT MEDICAL EXAMINATION WITH ABNORMAL FINDINGS: ICD-10-CM

## 2024-10-11 DIAGNOSIS — J32.9 BACTERIAL SINUSITIS: ICD-10-CM

## 2024-10-11 LAB
ALBUMIN SERPL BCP-MCNC: 3.7 G/DL (ref 3.5–5.2)
ALP SERPL-CCNC: 32 U/L (ref 55–135)
ALT SERPL W/O P-5'-P-CCNC: 25 U/L (ref 10–44)
ANION GAP SERPL CALC-SCNC: 8 MMOL/L (ref 8–16)
AST SERPL-CCNC: 21 U/L (ref 10–40)
BASOPHILS # BLD AUTO: 0.02 K/UL (ref 0–0.2)
BASOPHILS NFR BLD: 0.4 % (ref 0–1.9)
BILIRUB SERPL-MCNC: 0.5 MG/DL (ref 0.1–1)
BUN SERPL-MCNC: 12 MG/DL (ref 6–20)
CALCIUM SERPL-MCNC: 8.6 MG/DL (ref 8.7–10.5)
CHLORIDE SERPL-SCNC: 106 MMOL/L (ref 95–110)
CHOLEST SERPL-MCNC: 184 MG/DL (ref 120–199)
CHOLEST/HDLC SERPL: 4.1 {RATIO} (ref 2–5)
CO2 SERPL-SCNC: 25 MMOL/L (ref 23–29)
CREAT SERPL-MCNC: 0.8 MG/DL (ref 0.5–1.4)
DIFFERENTIAL METHOD BLD: ABNORMAL
EOSINOPHIL # BLD AUTO: 0.3 K/UL (ref 0–0.5)
EOSINOPHIL NFR BLD: 6.7 % (ref 0–8)
ERYTHROCYTE [DISTWIDTH] IN BLOOD BY AUTOMATED COUNT: 13.5 % (ref 11.5–14.5)
EST. GFR  (NO RACE VARIABLE): >60 ML/MIN/1.73 M^2
GLUCOSE SERPL-MCNC: 86 MG/DL (ref 70–110)
HCT VFR BLD AUTO: 40.1 % (ref 37–48.5)
HDLC SERPL-MCNC: 45 MG/DL (ref 40–75)
HDLC SERPL: 24.5 % (ref 20–50)
HGB BLD-MCNC: 12.8 G/DL (ref 12–16)
IMM GRANULOCYTES # BLD AUTO: 0.01 K/UL (ref 0–0.04)
IMM GRANULOCYTES NFR BLD AUTO: 0.2 % (ref 0–0.5)
LDLC SERPL CALC-MCNC: 120.8 MG/DL (ref 63–159)
LYMPHOCYTES # BLD AUTO: 1.8 K/UL (ref 1–4.8)
LYMPHOCYTES NFR BLD: 37.3 % (ref 18–48)
MCH RBC QN AUTO: 27.2 PG (ref 27–31)
MCHC RBC AUTO-ENTMCNC: 31.9 G/DL (ref 32–36)
MCV RBC AUTO: 85 FL (ref 82–98)
MONOCYTES # BLD AUTO: 0.4 K/UL (ref 0.3–1)
MONOCYTES NFR BLD: 9 % (ref 4–15)
NEUTROPHILS # BLD AUTO: 2.3 K/UL (ref 1.8–7.7)
NEUTROPHILS NFR BLD: 46.4 % (ref 38–73)
NONHDLC SERPL-MCNC: 139 MG/DL
NRBC BLD-RTO: 0 /100 WBC
PLATELET # BLD AUTO: 232 K/UL (ref 150–450)
PMV BLD AUTO: 11.1 FL (ref 9.2–12.9)
POTASSIUM SERPL-SCNC: 4.2 MMOL/L (ref 3.5–5.1)
PROT SERPL-MCNC: 6.8 G/DL (ref 6–8.4)
RBC # BLD AUTO: 4.7 M/UL (ref 4–5.4)
SODIUM SERPL-SCNC: 139 MMOL/L (ref 136–145)
TRIGL SERPL-MCNC: 91 MG/DL (ref 30–150)
TSH SERPL DL<=0.005 MIU/L-ACNC: 1.43 UIU/ML (ref 0.4–4)
WBC # BLD AUTO: 4.9 K/UL (ref 3.9–12.7)

## 2024-10-11 PROCEDURE — 36415 COLL VENOUS BLD VENIPUNCTURE: CPT | Mod: PO | Performed by: FAMILY MEDICINE

## 2024-10-11 PROCEDURE — 99999 PR PBB SHADOW E&M-EST. PATIENT-LVL IV: CPT | Mod: PBBFAC,,, | Performed by: FAMILY MEDICINE

## 2024-10-11 PROCEDURE — 84443 ASSAY THYROID STIM HORMONE: CPT | Performed by: FAMILY MEDICINE

## 2024-10-11 PROCEDURE — 80061 LIPID PANEL: CPT | Performed by: FAMILY MEDICINE

## 2024-10-11 PROCEDURE — 85025 COMPLETE CBC W/AUTO DIFF WBC: CPT | Performed by: FAMILY MEDICINE

## 2024-10-11 PROCEDURE — 80053 COMPREHEN METABOLIC PANEL: CPT | Performed by: FAMILY MEDICINE

## 2024-10-11 RX ORDER — METHYLPREDNISOLONE 4 MG/1
TABLET ORAL
Qty: 21 EACH | Refills: 0 | Status: SHIPPED | OUTPATIENT
Start: 2024-10-11 | End: 2024-11-01

## 2024-10-11 RX ORDER — LEVOTHYROXINE SODIUM 88 UG/1
88 TABLET ORAL DAILY
Qty: 90 TABLET | Refills: 3 | Status: SHIPPED | OUTPATIENT
Start: 2024-10-11

## 2024-10-11 RX ORDER — DOXYCYCLINE 100 MG/1
100 CAPSULE ORAL 2 TIMES DAILY
Qty: 20 CAPSULE | Refills: 0 | Status: SHIPPED | OUTPATIENT
Start: 2024-10-11 | End: 2024-10-21

## 2024-10-11 NOTE — PROGRESS NOTES
Assessment:       1. Annual visit for general adult medical examination with abnormal findings    2. Encounter for screening mammogram for malignant neoplasm of breast    3. Other specified hypothyroidism    4. Bacterial maxillary sinusitis    5. Laryngitis        Assessment & Plan  Annual visit for general adult medical examination with abnormal findings  -     CBC Auto Differential; Future; Expected date: 10/11/2024  -     Comprehensive Metabolic Panel; Future; Expected date: 10/11/2024  -     Lipid Panel; Future; Expected date: 10/11/2024  -     TSH; Future; Expected date: 10/11/2024    Encounter for screening mammogram for malignant neoplasm of breast  -     Mammo Digital Screening Bilat w/ Kike; Future; Expected date: 10/11/2024    Other specified hypothyroidism:  Stable  -     levothyroxine (SYNTHROID) 88 MCG tablet; Take 1 tablet (88 mcg total) by mouth once daily.  Dispense: 90 tablet; Refill: 3    Bacterial sinusitis:  New problem, next visit workup  -     methylPREDNISolone (MEDROL DOSEPACK) 4 mg tablet; use as directed on package  Dispense: 21 each; Refill: 0  -     doxycycline (MONODOX) 100 MG capsule; Take 1 capsule (100 mg total) by mouth 2 (two) times daily. for 10 days  Dispense: 20 capsule; Refill: 0    Laryngitis:  New problem, next visit workup       Her vital signs including blood pressure, oxygen saturation, and pulse rate are within normal limits. She has lost a few pounds since the last visit. Her kidney, liver, electrolyte panel, cholesterol, thyroid function, and blood count are all within normal range. An ultrasound conducted in 2023 revealed a small thyroid gland and some thyroid nodules, which do not warrant a biopsy at this time. A CT calcium score was also performed to assess cardiac health and plaque burden, which yielded a score of zero. She is advised to continue her current exercise routine and maintain a healthy diet.    She presents with mild sinus infection symptoms, including  hoarseness and pressure in the sinuses. A five-day course of steroids will be prescribed to reduce inflammation. She is advised to maintain hydration, use a nasal saline spray for sinus clearance, and rest her voice. Warm water is recommended over cold water for throat comfort. Supplements such as vitamin C and zinc are suggested to boost her immune system.    She is currently taking levothyroxine and progesterone. A prescription for levothyroxine will be sent to her pharmacy. She has switched from Zyrtec to Claritin for allergies. Zofran, previously prescribed for nausea, will be discontinued.    A mammogram will be ordered for the end of October. She is advised to receive the shingles vaccine at a pharmacy and the influenza vaccine four weeks later.              Patient agreed with assessment and plan. Patient verbalized understanding.     Subjective:       Patient ID: Kristen Welch is a 59 y.o. female.    Chief Complaint: Annual Exam    HPI  History of Present Illness  The patient presents for an annual checkup.    She wishes to have her blood work done. She maintains a healthy lifestyle, exercising 4 to 5 times a week and consuming a balanced diet.    Her current medications include levothyroxine and progesterone 200. She has recently switched from Zyrtec to Claritin. She was prescribed Zofran for nausea during a bout of flu, but she has not taken it.    She reports no nasal congestion and can breathe easily through her nose. She has not experienced any headaches. She has been using hot tea as a home remedy for her symptoms.    ALLERGIES  She is allergic to PENICILLIN.       Past medical history, past social history was reviewed and discussed with the patient.    Review of Systems    Objective:      Physical Exam  Constitutional:       General: She is not in acute distress.     Appearance: Normal appearance. She is normal weight. She is not toxic-appearing.   HENT:      Head: Normocephalic and atraumatic.       Nose:      Right Sinus: Maxillary sinus tenderness present. No frontal sinus tenderness.      Left Sinus: Maxillary sinus tenderness present. No frontal sinus tenderness.   Eyes:      Conjunctiva/sclera: Conjunctivae normal.      Pupils: Pupils are equal, round, and reactive to light.   Cardiovascular:      Rate and Rhythm: Normal rate and regular rhythm.   Pulmonary:      Effort: Pulmonary effort is normal. No respiratory distress.      Breath sounds: Normal breath sounds.   Abdominal:      General: Abdomen is flat. Bowel sounds are normal. There is no distension.      Palpations: Abdomen is soft.   Musculoskeletal:      Right lower leg: No edema.      Left lower leg: No edema.   Neurological:      Mental Status: She is alert.         Physical Exam      Vital Signs  Blood pressure is 110/82. Pulse is 72. Weight is 142 pounds.     Results  Laboratory Studies  Kidney, liver, electrolytes were normal. Cholesterol levels were good. Thyroid function was normal. Blood count was fine.    Imaging  Ultrasound showed small thyroid gland with some limb thyroid nodules, none of them meet criteria for biopsy. CT calcium score was zero.     This note was generated with the assistance of ambient listening technology. Verbal consent was obtained by the patient and accompanying visitor(s) for the recording of patient appointment to facilitate this note. I attest to having reviewed and edited the generated note for accuracy, though some syntax or spelling errors may persist. Please contact the author of this note for any clarification.

## 2024-10-15 DIAGNOSIS — E83.51 HYPOCALCEMIA: Primary | ICD-10-CM

## 2024-10-30 ENCOUNTER — OFFICE VISIT (OUTPATIENT)
Dept: OPTOMETRY | Facility: CLINIC | Age: 59
End: 2024-10-30
Payer: COMMERCIAL

## 2024-10-30 DIAGNOSIS — Z01.00 EXAMINATION OF EYES AND VISION: Primary | ICD-10-CM

## 2024-10-30 DIAGNOSIS — H52.4 HYPEROPIA WITH ASTIGMATISM AND PRESBYOPIA, BILATERAL: ICD-10-CM

## 2024-10-30 DIAGNOSIS — H52.203 HYPEROPIA WITH ASTIGMATISM AND PRESBYOPIA, BILATERAL: ICD-10-CM

## 2024-10-30 DIAGNOSIS — H52.03 HYPEROPIA WITH ASTIGMATISM AND PRESBYOPIA, BILATERAL: ICD-10-CM

## 2024-10-30 DIAGNOSIS — H43.393 VITREOUS FLOATERS, BILATERAL: ICD-10-CM

## 2024-10-30 PROCEDURE — 1159F MED LIST DOCD IN RCRD: CPT | Mod: CPTII,S$GLB,, | Performed by: OPTOMETRIST

## 2024-10-30 PROCEDURE — 92015 DETERMINE REFRACTIVE STATE: CPT | Mod: S$GLB,,, | Performed by: OPTOMETRIST

## 2024-10-30 PROCEDURE — 92014 COMPRE OPH EXAM EST PT 1/>: CPT | Mod: S$GLB,,, | Performed by: OPTOMETRIST

## 2024-10-30 PROCEDURE — 99999 PR PBB SHADOW E&M-EST. PATIENT-LVL III: CPT | Mod: PBBFAC,,, | Performed by: OPTOMETRIST

## 2024-11-06 ENCOUNTER — HOSPITAL ENCOUNTER (OUTPATIENT)
Dept: RADIOLOGY | Facility: HOSPITAL | Age: 59
Discharge: HOME | End: 2024-11-06
Attending: FAMILY MEDICINE
Payer: COMMERCIAL

## 2024-11-06 DIAGNOSIS — Z12.31 ENCOUNTER FOR SCREENING MAMMOGRAM FOR MALIGNANT NEOPLASM OF BREAST: ICD-10-CM

## 2024-11-06 PROCEDURE — 77067 SCR MAMMO BI INCL CAD: CPT | Mod: TC,PO

## 2024-11-06 PROCEDURE — 77063 BREAST TOMOSYNTHESIS BI: CPT | Mod: 26,,, | Performed by: RADIOLOGY

## 2024-11-06 PROCEDURE — 77067 SCR MAMMO BI INCL CAD: CPT | Mod: 26,,, | Performed by: RADIOLOGY

## 2024-11-12 ENCOUNTER — TELEPHONE (OUTPATIENT)
Dept: FAMILY MEDICINE | Facility: CLINIC | Age: 59
End: 2024-11-12
Payer: COMMERCIAL

## 2024-11-12 NOTE — TELEPHONE ENCOUNTER
----- Message from Annie sent at 11/12/2024 12:30 PM CST -----  Contact: self  Type:  Sooner Appointment Request    Caller is requesting a sooner appointment.  Caller declined first available appointment listed below.  Caller will not accept being placed on the waitlist and is requesting a message be sent to doctor.    Name of Caller:  the patient  When is the first available appointment?  today  Symptoms:       Best Call Back Number:  [phone]  Additional Information:  pt accidentally accepted appt today and wants old appt back, please call

## 2024-12-19 ENCOUNTER — OFFICE VISIT (OUTPATIENT)
Dept: URGENT CARE | Facility: CLINIC | Age: 59
End: 2024-12-19
Payer: COMMERCIAL

## 2024-12-19 VITALS
HEIGHT: 69 IN | SYSTOLIC BLOOD PRESSURE: 123 MMHG | DIASTOLIC BLOOD PRESSURE: 80 MMHG | HEART RATE: 85 BPM | WEIGHT: 145 LBS | OXYGEN SATURATION: 99 % | BODY MASS INDEX: 21.48 KG/M2 | RESPIRATION RATE: 15 BRPM | TEMPERATURE: 98 F

## 2024-12-19 DIAGNOSIS — R05.9 COUGH, UNSPECIFIED TYPE: ICD-10-CM

## 2024-12-19 DIAGNOSIS — R09.81 SINUS CONGESTION: ICD-10-CM

## 2024-12-19 DIAGNOSIS — B96.89 BACTERIAL SINUSITIS: Primary | ICD-10-CM

## 2024-12-19 DIAGNOSIS — R09.82 POSTNASAL DRIP: ICD-10-CM

## 2024-12-19 DIAGNOSIS — J32.9 BACTERIAL SINUSITIS: Primary | ICD-10-CM

## 2024-12-19 PROCEDURE — 99213 OFFICE O/P EST LOW 20 MIN: CPT | Mod: S$GLB,,, | Performed by: PHYSICIAN ASSISTANT

## 2024-12-19 RX ORDER — DOXYCYCLINE HYCLATE 100 MG
100 TABLET ORAL 2 TIMES DAILY
Qty: 20 TABLET | Refills: 0 | Status: SHIPPED | OUTPATIENT
Start: 2024-12-19 | End: 2024-12-29

## 2024-12-19 RX ORDER — FLUTICASONE PROPIONATE 50 MCG
1 SPRAY, SUSPENSION (ML) NASAL 2 TIMES DAILY
Qty: 16 G | Refills: 3 | Status: SHIPPED | OUTPATIENT
Start: 2024-12-19

## 2024-12-19 RX ORDER — AZELASTINE 1 MG/ML
1 SPRAY, METERED NASAL 2 TIMES DAILY PRN
Qty: 30 ML | Refills: 3 | Status: SHIPPED | OUTPATIENT
Start: 2024-12-19

## 2024-12-19 NOTE — PROGRESS NOTES
"Subjective:      Patient ID: Kristen Welch is a 59 y.o. female.    Vitals:  height is 5' 9" (1.753 m) and weight is 65.8 kg (145 lb). Her oral temperature is 98.1 °F (36.7 °C). Her blood pressure is 123/80 and her pulse is 85. Her respiration is 15 and oxygen saturation is 99%.     Chief Complaint: Cough    Cough  This is a new problem. The current episode started 1 to 4 weeks ago. The problem has been gradually worsening. The problem occurs constantly. The cough is Productive of sputum. Associated symptoms include nasal congestion, postnasal drip, rhinorrhea and a sore throat. Pertinent negatives include no chest pain, chills, ear congestion, ear pain, eye redness, fever, headaches, heartburn, hemoptysis, myalgias, rash, shortness of breath, sweats, weight loss or wheezing. Nothing aggravates the symptoms. She has tried OTC cough suppressant for the symptoms. The treatment provided mild relief.       Constitution: Negative for chills, sweating, fatigue and fever.   HENT:  Positive for congestion, postnasal drip, sinus pain, sinus pressure and sore throat. Negative for ear pain, drooling, nosebleeds, foreign body in nose, trouble swallowing and voice change.    Neck: Negative for neck pain, neck stiffness, painful lymph nodes and neck swelling.   Cardiovascular:  Negative for chest pain, leg swelling, palpitations, sob on exertion and passing out.   Eyes:  Negative for eye pain, eye redness, photophobia, double vision, blurred vision and eyelid swelling.   Respiratory:  Positive for cough and sputum production. Negative for chest tightness, bloody sputum, shortness of breath, stridor and wheezing.    Gastrointestinal:  Negative for abdominal pain, abdominal bloating, nausea, vomiting, constipation, diarrhea and heartburn.   Musculoskeletal:  Negative for joint pain, joint swelling, abnormal ROM of joint, back pain, muscle cramps and muscle ache.   Skin:  Negative for rash and hives.   Allergic/Immunologic: " Negative for seasonal allergies, food allergies, hives, itching and sneezing.   Neurological:  Negative for dizziness, light-headedness, passing out, loss of balance, headaches, altered mental status, loss of consciousness and seizures.   Hematologic/Lymphatic: Negative for swollen lymph nodes.   Psychiatric/Behavioral:  Negative for altered mental status and nervous/anxious. The patient is not nervous/anxious.       Objective:     Physical Exam   Constitutional: She is oriented to person, place, and time. She appears well-developed. She is cooperative.  Non-toxic appearance. She does not appear ill. No distress.   HENT:   Head: Normocephalic and atraumatic.   Ears:   Right Ear: Hearing, tympanic membrane, external ear and ear canal normal.   Left Ear: Hearing, tympanic membrane, external ear and ear canal normal.   Nose: Mucosal edema and rhinorrhea present. No nasal deformity. No epistaxis. Right sinus exhibits maxillary sinus tenderness and frontal sinus tenderness. Left sinus exhibits maxillary sinus tenderness and frontal sinus tenderness.   Mouth/Throat: Uvula is midline and mucous membranes are normal. No trismus in the jaw. Normal dentition. No uvula swelling. Posterior oropharyngeal erythema and cobblestoning present. No oropharyngeal exudate, posterior oropharyngeal edema or tonsillar abscesses. No tonsillar exudate.   Eyes: Conjunctivae and lids are normal. No scleral icterus.   Neck: Trachea normal and phonation normal. Neck supple. No edema present. No erythema present. No neck rigidity present.   Cardiovascular: Normal rate, regular rhythm, normal heart sounds and normal pulses.   Pulmonary/Chest: Effort normal and breath sounds normal. No accessory muscle usage or stridor. No respiratory distress. She has no decreased breath sounds. She has no wheezes. She has no rhonchi. She has no rales.   Abdominal: Normal appearance.   Musculoskeletal: Normal range of motion.         General: No deformity or  edema. Normal range of motion.   Lymphadenopathy:     She has no cervical adenopathy.   Neurological: She is alert and oriented to person, place, and time. She exhibits normal muscle tone. Coordination normal.   Skin: Skin is warm, dry, intact, not diaphoretic, not pale and no rash. Capillary refill takes less than 2 seconds.   Psychiatric: Her speech is normal and behavior is normal. Judgment and thought content normal.   Nursing note and vitals reviewed.    Assessment:     1. Bacterial sinusitis    2. Postnasal drip    3. Cough, unspecified type    4. Sinus congestion        Plan:   Discussed viral versus bacterial versus allergy with patient. Patient reports symptoms for > 10 days, will go ahead with antibiotics RX at this time. Patient is allergic to PCN, so will go with Doxy RX at this time. Advised close follow-up with PCP and/or Specialist for further evaluation as needed. ER precautions given to patient as well. Patient aware, verbalized understanding and agreed with plan of care.    Bacterial sinusitis    Postnasal drip    Cough, unspecified type    Sinus congestion    Other orders  -     azelastine (ASTELIN) 137 mcg (0.1 %) nasal spray; 1 spray (137 mcg total) by Nasal route 2 (two) times daily as needed for Rhinitis.  Dispense: 30 mL; Refill: 3  -     fluticasone propionate (FLONASE ALLERGY RELIEF) 50 mcg/actuation nasal spray; 1 spray (50 mcg total) by Each Nostril route 2 (two) times daily.  Dispense: 16 mL; Refill: 3  -     doxycycline (VIBRA-TABS) 100 MG tablet; Take 1 tablet (100 mg total) by mouth 2 (two) times daily. DO NOT TAKE ON EMPTY STOMACH. PLEASE TAKE WITH FOOD. for 10 days  Dispense: 20 tablet; Refill: 0      There are no Patient Instructions on file for this visit.

## 2025-02-08 ENCOUNTER — OFFICE VISIT (OUTPATIENT)
Dept: URGENT CARE | Facility: CLINIC | Age: 60
End: 2025-02-08
Payer: COMMERCIAL

## 2025-02-08 VITALS
HEIGHT: 69 IN | HEART RATE: 89 BPM | TEMPERATURE: 98 F | OXYGEN SATURATION: 98 % | SYSTOLIC BLOOD PRESSURE: 104 MMHG | BODY MASS INDEX: 20.73 KG/M2 | DIASTOLIC BLOOD PRESSURE: 64 MMHG | WEIGHT: 140 LBS | RESPIRATION RATE: 18 BRPM

## 2025-02-08 DIAGNOSIS — J32.1 SINUSITIS CHRONIC, FRONTAL: ICD-10-CM

## 2025-02-08 DIAGNOSIS — R05.9 COUGH, UNSPECIFIED TYPE: Primary | ICD-10-CM

## 2025-02-08 LAB
CTP QC/QA: YES
CTP QC/QA: YES
POC MOLECULAR INFLUENZA A AGN: NEGATIVE
POC MOLECULAR INFLUENZA B AGN: NEGATIVE
SARS CORONAVIRUS 2 ANTIGEN: NEGATIVE

## 2025-02-08 PROCEDURE — 87502 INFLUENZA DNA AMP PROBE: CPT | Mod: QW,S$GLB,, | Performed by: NURSE PRACTITIONER

## 2025-02-08 PROCEDURE — 87811 SARS-COV-2 COVID19 W/OPTIC: CPT | Mod: QW,S$GLB,, | Performed by: NURSE PRACTITIONER

## 2025-02-08 PROCEDURE — 99213 OFFICE O/P EST LOW 20 MIN: CPT | Mod: S$GLB,,, | Performed by: NURSE PRACTITIONER

## 2025-02-08 RX ORDER — DOXYCYCLINE HYCLATE 100 MG
100 TABLET ORAL EVERY 12 HOURS
Qty: 14 TABLET | Refills: 0 | Status: SHIPPED | OUTPATIENT
Start: 2025-02-08 | End: 2025-02-15

## 2025-02-08 NOTE — PROGRESS NOTES
"Subjective:      Patient ID: Kristen Welch is a 59 y.o. female.    Vitals:  height is 5' 9" (1.753 m) and weight is 63.5 kg (140 lb). Her oral temperature is 98.2 °F (36.8 °C). Her blood pressure is 104/64 and her pulse is 89. Her respiration is 18 and oxygen saturation is 98%.     Chief Complaint: Cough    Pt reports to clinic with dry cough, post nasal drip, head pressure onset 1 week ago and using OTC cold medication for symptoms- no relief. Pain rated 5/10.    Cough  This is a new problem. The current episode started in the past 7 days. The problem has been unchanged. The problem occurs constantly. Nothing aggravates the symptoms. She has tried OTC cough suppressant for the symptoms. The treatment provided no relief.       Respiratory:  Positive for cough.       Objective:     Physical Exam   Constitutional: She is oriented to person, place, and time. She appears well-developed. She is cooperative.  Non-toxic appearance. She does not appear ill. No distress.   HENT:   Head: Normocephalic and atraumatic.   Ears:   Right Ear: Hearing, tympanic membrane, external ear and ear canal normal.   Left Ear: Hearing, tympanic membrane, external ear and ear canal normal.   Nose: Mucosal edema and rhinorrhea present. No nasal deformity. No epistaxis. Right sinus exhibits frontal sinus tenderness. Right sinus exhibits no maxillary sinus tenderness. Left sinus exhibits frontal sinus tenderness. Left sinus exhibits no maxillary sinus tenderness.   Mouth/Throat: Uvula is midline, oropharynx is clear and moist and mucous membranes are normal. No trismus in the jaw. Normal dentition. No uvula swelling. No oropharyngeal exudate, posterior oropharyngeal edema or posterior oropharyngeal erythema.   Eyes: Conjunctivae and lids are normal. No scleral icterus.   Neck: Trachea normal and phonation normal. Neck supple. No edema present. No erythema present. No neck rigidity present.   Cardiovascular: Normal rate, regular rhythm, normal " heart sounds and normal pulses.   Pulmonary/Chest: Effort normal and breath sounds normal. No respiratory distress. She has no decreased breath sounds. She has no rhonchi.   Abdominal: Normal appearance.   Musculoskeletal: Normal range of motion.         General: No deformity. Normal range of motion.   Neurological: She is alert and oriented to person, place, and time. She exhibits normal muscle tone. Coordination normal.   Skin: Skin is warm, dry, intact, not diaphoretic and not pale.   Psychiatric: Her speech is normal and behavior is normal. Judgment and thought content normal.   Nursing note and vitals reviewed.      Assessment:     1. Cough, unspecified type    2. Sinusitis chronic, frontal      Results for orders placed or performed in visit on 02/08/25   SARS Coronavirus 2 Antigen, POCT Manual Read    Collection Time: 02/08/25  2:43 PM   Result Value Ref Range    SARS Coronavirus 2 Antigen Negative Negative, Presumptive Negative     Acceptable Yes    POCT Influenza A/B MOLECULAR    Collection Time: 02/08/25  2:43 PM   Result Value Ref Range    POC Molecular Influenza A Ag Negative Negative    POC Molecular Influenza B Ag Negative Negative     Acceptable Yes        Plan:       Cough, unspecified type  -     SARS Coronavirus 2 Antigen, POCT Manual Read  -     POCT Influenza A/B MOLECULAR    Sinusitis chronic, frontal  -     doxycycline (VIBRA-TABS) 100 MG tablet; Take 1 tablet (100 mg total) by mouth every 12 (twelve) hours. for 7 days  Dispense: 14 tablet; Refill: 0    INSTRUCTIONS:  - Rest.  - Drink plenty of fluids.  - Take Tylenol and/or Ibuprofen as directed as needed for fever/pain.  Do not take more than the recommended dose.  - follow up with your PCP within the next 1-2 weeks as needed.  - You must understand that you have received an Urgent Care treatment only and that you may be released before all of your medical problems are known or treated.   - You, the patient, will  arrange for follow up care as instructed.   - If your condition worsens or fails to improve we recommend that you receive another evaluation at the ER immediately or contact your PCP to discuss your concerns.   - You can call [phone] or [phone] to help schedule an appointment with the appropriate provider.     -If you smoke cigarettes, it would be beneficial for you to stop.    Monitor for new or worsening symptoms    OTC for symptom control    Recommend follow up with PCP/Pediatrician if symptoms are worsening

## 2025-03-17 NOTE — PROGRESS NOTES
[facility]   Physical Therapy Treatment Note      Name: Kristen Welch  [facility] Number: [id number]    Therapy Diagnosis:   Encounter Diagnoses   Name Primary?    Acute right ankle pain Yes    Sprain of anterior talofibular ligament of right ankle, subsequent encounter     Sprain of deltoid ligament of right ankle, sequela     Decreased range of motion of right ankle     Muscle weakness          Physician: Tj Servin MD    Visit Date: 9/21/2023    Physician Orders: PT Eval and Treat 2-3 times per week for 6 to 8 weeks per low ankle sprain protocol.  Atfl AND deltoid ligament.  Medical Diagnosis from Referral:   S93.491A (ICD-10-CM) - Sprain of anterior talofibular ligament of right ankle, initial encounter   S93.421A (ICD-10-CM) - Sprain of deltoid ligament of right ankle, initial encounter      Evaluation Date: 8/25/2023  Authorization Period Expiration: 8-23-24  Plan of Care Expiration: 10-20-23  Progress Note Due: 9-25-23  Visit # / Visits authorized: 6/20 +eval  FOTO: Issued Visit #: 1     PTA Visit #: 2/5     MD Follow up appointment: none scheduled     Precautions: Standard and , low ankle sprain precautions for deltoid ligament and ATFL      Time In: 1:48  Time Out: 2:28  Total Billable Time: 40 minutes      Subjective     Patient reports: she did walk 1.5 miles little more swelling and little pain today.  Pt states able to wear flat and low wedge, no heels yet   She was compliant with home exercise program.  Response to previous treatment: felt good  Functional change: walking without brace and able to walk 1.5 miles     Pain: 1/10 with movement 1/10   Location: right ankle    Objective      Ankle Girth: (measured in centimeters)  9-21-23 pt seen in PM  Ankle RLE   Mid malleoli 25.9   Mid foot 21.5   MT heads 22.0   Mid calf  33.5      Ankle Girth: (measured in centimeters) initial eval seen in AM  Ankle LLE RLE   Mid malleoli 24.6 26.5   Mid foot 21.8 22.5   MT heads 22.0  22.0   Mid calf  32.5 32.8      Ankle ROM: (measured in degrees) 9-21-23  Ankle RLE   Dorsiflexion with knees straight 8   Dorsiflexion with knees bent 15   Plantarflexion 45   Inversion 30   Eversion 20   Great toe flexion 45   Great toe extension 65         Ankle ROM: (measured in degrees) initial eval   Ankle LLE RLE   Dorsiflexion with knees straight 10 3   Dorsiflexion with knees bent 15 5   Plantarflexion 45 35   Inversion 30 20   Eversion 20 10   Great toe flexion 45 25   Great toe extension 70 60       Treatment     Kristen received the treatments listed below:      therapeutic exercises to develop strength, endurance, ROM, and flexibility for 20 minutes including:  Emphasis on pain free zone     Ankle 4 way blue thera-band x 10 ea - demonstrated with good technique  GSS with strap 10  Soleus stretch x 10 with strap  Instructed pt to bring in tennis shoes for next session    Held due to time constraints  Toe crunches 1 min, reinforced full flexion    Towel inversion/eversion slides, heel on blue oval foam x 3 rounds each  (NP) Towel eversion 1 min  Toe splay x 15   Arch doming 10 x 5s seated   Standing arch doming 10 x 5s  Standing heel raises x 15 with doming    manual therapy techniques: Joint Mobilizations, Soft tissue Mobilization and effleurage were applied to the: Right ankle for 10 minutes, including:  Good mobility noted with toes and forefoot   Gentle Grade I-II talar glides A and P for improved ankle dorsiflexion and planter flexion  Assessed fibular mobility at proximal and distal heads    MWM to subtalar in standing x 10     (NP)Kinesiotaping: 10 inch Y strip along gastroc/achilles for support/edema control, 8 inch I strip for navicular support to reduce midfoot pronation    neuromuscular re-education activities to improve: Balance, Coordination, Proprioception, and Motor Control for 00 minutes. The following activities were included:  May add at later session    therapeutic activities to  "improve functional performance for 10 minutes, including:pronation support she can use Pt has a pair of vionic and tennis shoes have arch support   Heel raises x 20 with emphasis on eccentrics   March on Air ex x 2 min    Single limb stance right lower extremity Airex 15"H x 3  LOB x 3 on last set feeling fatigued  (NP)Single leg balance on left with perturbation to land on R LE to simulate if having to put foot down quickly when biking.    [employer] board level 1 x 10 PF/DF, IN/EV, balance 1 min         Instructed pt to ice and elevate when she goes home after PT.    Patient Education and Home Exercises       Education provided:   - stretching to point of tightness not pain  - comfortable range of motion with all exercises  - importance of compliance to HEP    Written Home Exercises Provided: continue prior HEP Exercises were reviewed and Kristen was able to demonstrate them prior to the end of the session.  Kristen demonstrated good  understanding of the education provided. See Electronic Medical Record under Patient Instructions for exercises provided during therapy sessions    Assessment   Pt improving with ability to walk without brace and to walk for exercise.  Pt tolerated treatment well and able to progress with strengthening and stabilization without adverse effect though was fatigued at end.  Pt appears to understand not to increase walking for exercise yet and consider decreasing if continue with increased soreness and swelling     Kristen Is progressing well towards her goals.   Patient prognosis is Good.     Patient will continue to benefit from skilled outpatient physical therapy to address the deficits listed in the problem list box on initial evaluation, provide pt/family education and to maximize pt's level of independence in the home and community environment.     Patient's spiritual, cultural and educational needs considered and pt agreeable to plan of care and goals.     Anticipated barriers to " physical therapy: none    Goals:   Short Term Goals:  4 weeks (Progressing, not met)  Increase range of motion 25%  Increase strength 1/2 muscle grade  Be able to perform HEP with minimal cueing required  Decrease swelling of mid malleoli girth 0.5 cm     Long Term Goals: 8 weeks (Progressing, not met)  Increase range of motion to 75% to 100% full   Improve muscle strength 1 muscle grade  Improve muscle strength with MMT to 4+/5 to 5/5  Decrease swelling to min-0 level  Restore ability to ambulate with normal pain free gait without brace   Walking for ADL, work  and exercise will be restored without increased pain  Restore ability to participate in an exercise program for Wellness   Restore ability to stand for ADL and work without increased pain  Restore ability to climb stairs in a reciprocal manner with min to 0 increased pain and/or difficulty  Restore ability to perform ADL's and household activities independently and without increased pain    Plan     Continue with current POC toward established physical therapy goals.    Kinjal Amos, PT        No

## 2025-08-17 DIAGNOSIS — E03.8 OTHER SPECIFIED HYPOTHYROIDISM: ICD-10-CM

## 2025-08-18 RX ORDER — LEVOTHYROXINE SODIUM 88 UG/1
88 TABLET ORAL DAILY
Qty: 90 TABLET | Refills: 3 | Status: SHIPPED | OUTPATIENT
Start: 2025-08-18